# Patient Record
Sex: MALE | Race: WHITE | NOT HISPANIC OR LATINO | Employment: UNEMPLOYED | URBAN - METROPOLITAN AREA
[De-identification: names, ages, dates, MRNs, and addresses within clinical notes are randomized per-mention and may not be internally consistent; named-entity substitution may affect disease eponyms.]

---

## 2017-06-22 ENCOUNTER — OFFICE VISIT (OUTPATIENT)
Dept: INTERNAL MEDICINE | Facility: CLINIC | Age: 27
End: 2017-06-22

## 2017-06-22 VITALS
SYSTOLIC BLOOD PRESSURE: 128 MMHG | OXYGEN SATURATION: 98 % | DIASTOLIC BLOOD PRESSURE: 82 MMHG | WEIGHT: 208 LBS | BODY MASS INDEX: 29.01 KG/M2 | HEART RATE: 83 BPM

## 2017-06-22 DIAGNOSIS — N48.9 PENILE DISORDER: Primary | ICD-10-CM

## 2017-06-22 PROCEDURE — 86696 HERPES SIMPLEX TYPE 2 TEST: CPT | Performed by: PHYSICIAN ASSISTANT

## 2017-06-22 PROCEDURE — 99213 OFFICE O/P EST LOW 20 MIN: CPT | Performed by: PHYSICIAN ASSISTANT

## 2017-06-22 PROCEDURE — 86695 HERPES SIMPLEX TYPE 1 TEST: CPT | Performed by: PHYSICIAN ASSISTANT

## 2017-06-22 NOTE — PROGRESS NOTES
Chief Complaint   Patient presents with   • Test for STD       Subjective   Vinay Cano is a 27 y.o. male.       History of Present Illness     Pt has been told in the past that he had genital herpes and he and girlfriend want to know for sure what his diagnosis is. He has small area of bumps and raised skin on his penis- has been the same for years. Does not have outbreaks or changes. Sometimes has some discomfort with it. No concern for other STDs.        Current Outpatient Prescriptions:   •  lamoTRIgine (LaMICtal) 100 MG tablet, Take 100 mg by mouth Daily., Disp: , Rfl:   •  Lurasidone HCl (LATUDA PO), Take 160 mg by mouth Daily., Disp: , Rfl:   •  propranolol (INDERAL) 40 MG tablet, Take 40 mg by mouth 2 (Two) Times a Day., Disp: , Rfl:   •  sertraline (ZOLOFT) 100 MG tablet, Take 100 mg by mouth Daily., Disp: , Rfl:      PMFSH  The following portions of the patient's history were reviewed and updated as appropriate: allergies, current medications, past family history, past medical history, past social history, past surgical history and problem list.    Review of Systems   Constitutional: Negative for activity change, appetite change, diaphoresis and fatigue.   HENT: Negative.    Respiratory: Negative for chest tightness, shortness of breath and wheezing.    Cardiovascular: Negative for chest pain.   Genitourinary: Positive for genital sores. Negative for discharge, flank pain, frequency, hematuria, penile pain, penile swelling and scrotal swelling.   Neurological: Negative for dizziness, syncope, weakness and light-headedness.       Objective   /82  Pulse 83  Wt 208 lb (94.3 kg)  SpO2 98%  BMI 29.01 kg/m2    Physical Exam   Constitutional: He is oriented to person, place, and time.   HENT:   Head: Normocephalic and atraumatic.   Right Ear: External ear normal.   Left Ear: External ear normal.   Eyes: Conjunctivae are normal.   Neck: Normal range of motion.   Pulmonary/Chest: Effort normal.    Abdominal: Hernia confirmed negative in the right inguinal area and confirmed negative in the left inguinal area.   Genitourinary: Testes normal. Right testis shows no mass, no swelling and no tenderness. Right testis is descended. Left testis shows no mass, no swelling and no tenderness. Left testis is descended. Circumcised. No phimosis, paraphimosis, hypospadias, penile erythema or penile tenderness. No discharge found.   Lymphadenopathy: No inguinal adenopathy noted on the right or left side.   Neurological: He is alert and oriented to person, place, and time.   Skin: Skin is warm and dry.   Psychiatric: He has a normal mood and affect. His behavior is normal. Judgment and thought content normal.         ASSESSMENT/PLAN    Problem List Items Addressed This Visit        Genitourinary    Penile disorder - Primary    Relevant Orders    HSV 1 & 2 - Specific Antibody, IgG    HSV 1 & 2 IgM, Antibodies, Indirect               Return if symptoms worsen or fail to improve.

## 2017-06-26 LAB
HSV1 IGG SER IA-ACNC: 32.7 INDEX (ref 0–0.9)
HSV1 IGM TITR SER IF: NORMAL TITER
HSV2 IGG SER IA-ACNC: <0.91 INDEX (ref 0–0.9)
HSV2 IGM TITR SER IF: NORMAL TITER

## 2019-01-01 ENCOUNTER — OFFICE VISIT (OUTPATIENT)
Dept: INTERNAL MEDICINE | Facility: CLINIC | Age: 29
End: 2019-01-01

## 2019-01-01 VITALS
HEART RATE: 83 BPM | OXYGEN SATURATION: 97 % | SYSTOLIC BLOOD PRESSURE: 110 MMHG | WEIGHT: 223 LBS | HEIGHT: 71 IN | DIASTOLIC BLOOD PRESSURE: 80 MMHG | BODY MASS INDEX: 31.22 KG/M2

## 2019-01-01 DIAGNOSIS — Z72.0 TOBACCO ABUSE: ICD-10-CM

## 2019-01-01 DIAGNOSIS — Z00.00 HEALTH CARE MAINTENANCE: Primary | ICD-10-CM

## 2019-01-01 DIAGNOSIS — Z23 NEED FOR VACCINATION WITH 13-POLYVALENT PNEUMOCOCCAL CONJUGATE VACCINE: ICD-10-CM

## 2019-01-01 LAB
ALBUMIN SERPL-MCNC: 4.8 G/DL (ref 3.5–5.2)
ALBUMIN/GLOB SERPL: 1.8 G/DL
ALP SERPL-CCNC: 63 U/L (ref 39–117)
ALT SERPL W P-5'-P-CCNC: 111 U/L (ref 1–41)
ANION GAP SERPL CALCULATED.3IONS-SCNC: 12.7 MMOL/L (ref 5–15)
AST SERPL-CCNC: 46 U/L (ref 1–40)
BASOPHILS # BLD AUTO: 0.07 10*3/MM3 (ref 0–0.2)
BASOPHILS NFR BLD AUTO: 1.1 % (ref 0–1.5)
BILIRUB SERPL-MCNC: 0.7 MG/DL (ref 0.2–1.2)
BUN BLD-MCNC: 7 MG/DL (ref 6–20)
BUN/CREAT SERPL: 5.3 (ref 7–25)
CALCIUM SPEC-SCNC: 9.7 MG/DL (ref 8.6–10.5)
CHLORIDE SERPL-SCNC: 103 MMOL/L (ref 98–107)
CHOLEST SERPL-MCNC: 167 MG/DL (ref 0–200)
CO2 SERPL-SCNC: 26.3 MMOL/L (ref 22–29)
CREAT BLD-MCNC: 1.33 MG/DL (ref 0.76–1.27)
DEPRECATED RDW RBC AUTO: 42.6 FL (ref 37–54)
EOSINOPHIL # BLD AUTO: 0.09 10*3/MM3 (ref 0–0.4)
EOSINOPHIL NFR BLD AUTO: 1.5 % (ref 0.3–6.2)
ERYTHROCYTE [DISTWIDTH] IN BLOOD BY AUTOMATED COUNT: 13.1 % (ref 12.3–15.4)
GFR SERPL CREATININE-BSD FRML MDRD: 63 ML/MIN/1.73
GLOBULIN UR ELPH-MCNC: 2.7 GM/DL
GLUCOSE BLD-MCNC: 87 MG/DL (ref 65–99)
HCT VFR BLD AUTO: 45.9 % (ref 37.5–51)
HDLC SERPL-MCNC: 33 MG/DL (ref 40–60)
HGB BLD-MCNC: 15.6 G/DL (ref 13–17.7)
IMM GRANULOCYTES # BLD AUTO: 0.03 10*3/MM3 (ref 0–0.05)
IMM GRANULOCYTES NFR BLD AUTO: 0.5 % (ref 0–0.5)
LDLC SERPL CALC-MCNC: 95 MG/DL (ref 0–100)
LDLC/HDLC SERPL: 2.88 {RATIO}
LYMPHOCYTES # BLD AUTO: 2.03 10*3/MM3 (ref 0.7–3.1)
LYMPHOCYTES NFR BLD AUTO: 32.8 % (ref 19.6–45.3)
MCH RBC QN AUTO: 30.8 PG (ref 26.6–33)
MCHC RBC AUTO-ENTMCNC: 34 G/DL (ref 31.5–35.7)
MCV RBC AUTO: 90.5 FL (ref 79–97)
MONOCYTES # BLD AUTO: 0.68 10*3/MM3 (ref 0.1–0.9)
MONOCYTES NFR BLD AUTO: 11 % (ref 5–12)
NEUTROPHILS # BLD AUTO: 3.29 10*3/MM3 (ref 1.7–7)
NEUTROPHILS NFR BLD AUTO: 53.1 % (ref 42.7–76)
NRBC BLD AUTO-RTO: 0 /100 WBC (ref 0–0.2)
PLATELET # BLD AUTO: 291 10*3/MM3 (ref 140–450)
PMV BLD AUTO: 11.1 FL (ref 6–12)
POTASSIUM BLD-SCNC: 4.6 MMOL/L (ref 3.5–5.2)
PROT SERPL-MCNC: 7.5 G/DL (ref 6–8.5)
RBC # BLD AUTO: 5.07 10*6/MM3 (ref 4.14–5.8)
SODIUM BLD-SCNC: 142 MMOL/L (ref 136–145)
TESTOST SERPL-MCNC: 411 NG/DL (ref 249–836)
TRIGL SERPL-MCNC: 194 MG/DL (ref 0–150)
TSH SERPL DL<=0.05 MIU/L-ACNC: 1.72 UIU/ML (ref 0.27–4.2)
VLDLC SERPL-MCNC: 38.8 MG/DL (ref 5–40)
WBC NRBC COR # BLD: 6.19 10*3/MM3 (ref 3.4–10.8)

## 2019-01-01 PROCEDURE — 99395 PREV VISIT EST AGE 18-39: CPT | Performed by: PHYSICIAN ASSISTANT

## 2019-01-01 PROCEDURE — 90670 PCV13 VACCINE IM: CPT | Performed by: PHYSICIAN ASSISTANT

## 2019-01-01 PROCEDURE — 85025 COMPLETE CBC W/AUTO DIFF WBC: CPT | Performed by: PHYSICIAN ASSISTANT

## 2019-01-01 PROCEDURE — 84443 ASSAY THYROID STIM HORMONE: CPT | Performed by: PHYSICIAN ASSISTANT

## 2019-01-01 PROCEDURE — 80061 LIPID PANEL: CPT | Performed by: PHYSICIAN ASSISTANT

## 2019-01-01 PROCEDURE — 90471 IMMUNIZATION ADMIN: CPT | Performed by: PHYSICIAN ASSISTANT

## 2019-01-01 PROCEDURE — 84403 ASSAY OF TOTAL TESTOSTERONE: CPT | Performed by: PHYSICIAN ASSISTANT

## 2019-01-01 PROCEDURE — 80053 COMPREHEN METABOLIC PANEL: CPT | Performed by: PHYSICIAN ASSISTANT

## 2019-01-01 RX ORDER — CARIPRAZINE 1.5 MG/1
CAPSULE, GELATIN COATED ORAL
COMMUNITY
Start: 2019-12-12 | End: 2020-01-01

## 2019-08-14 ENCOUNTER — HOSPITAL ENCOUNTER (EMERGENCY)
Facility: HOSPITAL | Age: 29
Discharge: LEFT BEFORE DISCHARGE | End: 2019-08-14
Attending: EMERGENCY MEDICINE | Admitting: EMERGENCY MEDICINE

## 2019-08-14 ENCOUNTER — APPOINTMENT (OUTPATIENT)
Dept: GENERAL RADIOLOGY | Facility: HOSPITAL | Age: 29
End: 2019-08-14

## 2019-08-14 VITALS
RESPIRATION RATE: 16 BRPM | OXYGEN SATURATION: 98 % | SYSTOLIC BLOOD PRESSURE: 122 MMHG | HEART RATE: 76 BPM | TEMPERATURE: 98 F | HEIGHT: 71 IN | BODY MASS INDEX: 28.7 KG/M2 | DIASTOLIC BLOOD PRESSURE: 68 MMHG | WEIGHT: 205 LBS

## 2019-08-14 DIAGNOSIS — R07.9 ACUTE CHEST PAIN: Primary | ICD-10-CM

## 2019-08-14 DIAGNOSIS — J18.9 PNEUMONIA DUE TO INFECTIOUS ORGANISM, UNSPECIFIED LATERALITY, UNSPECIFIED PART OF LUNG: ICD-10-CM

## 2019-08-14 LAB
ALBUMIN SERPL-MCNC: 4.5 G/DL (ref 3.5–5.2)
ALBUMIN/GLOB SERPL: 1.8 G/DL
ALP SERPL-CCNC: 62 U/L (ref 39–117)
ALT SERPL W P-5'-P-CCNC: 40 U/L (ref 1–41)
ANION GAP SERPL CALCULATED.3IONS-SCNC: 11 MMOL/L (ref 5–15)
AST SERPL-CCNC: 22 U/L (ref 1–40)
BASOPHILS # BLD AUTO: 0.07 10*3/MM3 (ref 0–0.2)
BASOPHILS NFR BLD AUTO: 0.9 % (ref 0–1.5)
BILIRUB SERPL-MCNC: 0.3 MG/DL (ref 0.2–1.2)
BUN BLD-MCNC: 11 MG/DL (ref 6–20)
BUN/CREAT SERPL: 13.9 (ref 7–25)
CALCIUM SPEC-SCNC: 9.3 MG/DL (ref 8.6–10.5)
CHLORIDE SERPL-SCNC: 103 MMOL/L (ref 98–107)
CO2 SERPL-SCNC: 26 MMOL/L (ref 22–29)
CREAT BLD-MCNC: 0.79 MG/DL (ref 0.76–1.27)
D DIMER PPP FEU-MCNC: <0.27 MCGFEU/ML (ref 0–0.56)
DEPRECATED RDW RBC AUTO: 37.9 FL (ref 37–54)
EOSINOPHIL # BLD AUTO: 0.16 10*3/MM3 (ref 0–0.4)
EOSINOPHIL NFR BLD AUTO: 2.1 % (ref 0.3–6.2)
ERYTHROCYTE [DISTWIDTH] IN BLOOD BY AUTOMATED COUNT: 12.4 % (ref 12.3–15.4)
GFR SERPL CREATININE-BSD FRML MDRD: 114 ML/MIN/1.73
GLOBULIN UR ELPH-MCNC: 2.5 GM/DL
GLUCOSE BLD-MCNC: 96 MG/DL (ref 65–99)
HCT VFR BLD AUTO: 45.9 % (ref 37.5–51)
HGB BLD-MCNC: 15.5 G/DL (ref 13–17.7)
HOLD SPECIMEN: NORMAL
HOLD SPECIMEN: NORMAL
IMM GRANULOCYTES # BLD AUTO: 0.03 10*3/MM3 (ref 0–0.05)
IMM GRANULOCYTES NFR BLD AUTO: 0.4 % (ref 0–0.5)
LITHIUM SERPL-SCNC: 0.4 MMOL/L (ref 0.6–1.2)
LYMPHOCYTES # BLD AUTO: 3.13 10*3/MM3 (ref 0.7–3.1)
LYMPHOCYTES NFR BLD AUTO: 41.1 % (ref 19.6–45.3)
MCH RBC QN AUTO: 28.7 PG (ref 26.6–33)
MCHC RBC AUTO-ENTMCNC: 33.8 G/DL (ref 31.5–35.7)
MCV RBC AUTO: 84.8 FL (ref 79–97)
MONOCYTES # BLD AUTO: 0.78 10*3/MM3 (ref 0.1–0.9)
MONOCYTES NFR BLD AUTO: 10.2 % (ref 5–12)
NEUTROPHILS # BLD AUTO: 3.45 10*3/MM3 (ref 1.7–7)
NEUTROPHILS NFR BLD AUTO: 45.3 % (ref 42.7–76)
NRBC BLD AUTO-RTO: 0 /100 WBC (ref 0–0.2)
PLATELET # BLD AUTO: 286 10*3/MM3 (ref 140–450)
PMV BLD AUTO: 11.1 FL (ref 6–12)
POTASSIUM BLD-SCNC: 4.4 MMOL/L (ref 3.5–5.2)
PROT SERPL-MCNC: 7 G/DL (ref 6–8.5)
RBC # BLD AUTO: 5.41 10*6/MM3 (ref 4.14–5.8)
SODIUM BLD-SCNC: 140 MMOL/L (ref 136–145)
TROPONIN T SERPL-MCNC: <0.01 NG/ML (ref 0–0.03)
WBC NRBC COR # BLD: 7.62 10*3/MM3 (ref 3.4–10.8)
WHOLE BLOOD HOLD SPECIMEN: NORMAL
WHOLE BLOOD HOLD SPECIMEN: NORMAL

## 2019-08-14 PROCEDURE — 93005 ELECTROCARDIOGRAM TRACING: CPT | Performed by: EMERGENCY MEDICINE

## 2019-08-14 PROCEDURE — 80178 ASSAY OF LITHIUM: CPT | Performed by: NURSE PRACTITIONER

## 2019-08-14 PROCEDURE — 99283 EMERGENCY DEPT VISIT LOW MDM: CPT

## 2019-08-14 PROCEDURE — 71046 X-RAY EXAM CHEST 2 VIEWS: CPT

## 2019-08-14 PROCEDURE — 80053 COMPREHEN METABOLIC PANEL: CPT | Performed by: EMERGENCY MEDICINE

## 2019-08-14 PROCEDURE — 93005 ELECTROCARDIOGRAM TRACING: CPT

## 2019-08-14 PROCEDURE — 84484 ASSAY OF TROPONIN QUANT: CPT | Performed by: EMERGENCY MEDICINE

## 2019-08-14 PROCEDURE — 85025 COMPLETE CBC W/AUTO DIFF WBC: CPT | Performed by: EMERGENCY MEDICINE

## 2019-08-14 PROCEDURE — 85379 FIBRIN DEGRADATION QUANT: CPT | Performed by: EMERGENCY MEDICINE

## 2019-08-14 RX ORDER — AZITHROMYCIN 250 MG/1
TABLET, FILM COATED ORAL
Qty: 6 TABLET | Refills: 0 | Status: SHIPPED | OUTPATIENT
Start: 2019-08-14 | End: 2019-08-16

## 2019-08-14 RX ORDER — ALBUTEROL SULFATE 90 UG/1
2 AEROSOL, METERED RESPIRATORY (INHALATION) EVERY 4 HOURS PRN
Qty: 1 INHALER | Refills: 0 | Status: SHIPPED | OUTPATIENT
Start: 2019-08-14 | End: 2019-09-20 | Stop reason: ALTCHOICE

## 2019-08-14 NOTE — ED PROVIDER NOTES
Subjective   His anterior chest pain as well as epigastric pain that radiates into his left arm.  Is been off and on for 5 days.  It is not pleuritic.  There is no dizziness.  There is no fever.  He has had a slight cough.  Sent from [facility] for further evaluation.  Patient tells me he has a history of schizophrenia is on multiple psych meds and takes them as prescribed.  He feels much better taking them.  His father is at the bedside.        Chest Pain   Pain location:  Substernal area and epigastric  Pain quality: aching    Pain radiates to:  L shoulder  Pain severity:  Moderate  Onset quality:  Gradual  Duration:  5 days  Timing:  Intermittent  Progression:  Waxing and waning  Chronicity:  New  Context: at rest    Context: not breathing    Relieved by:  Nothing  Worsened by:  Nothing  Ineffective treatments:  None tried  Associated symptoms: anxiety and cough    Associated symptoms: no abdominal pain, no diaphoresis, no dizziness, no fever, no nausea, no shortness of breath and no vomiting        Review of Systems   Constitutional: Negative for chills, diaphoresis and fever.   HENT: Negative for congestion and sore throat.    Respiratory: Positive for cough. Negative for choking, chest tightness, shortness of breath and wheezing.    Cardiovascular: Positive for chest pain. Negative for leg swelling.   Gastrointestinal: Negative for abdominal distention, abdominal pain, anal bleeding, blood in stool, constipation, diarrhea, nausea and vomiting.   Genitourinary: Negative for difficulty urinating, dysuria, flank pain, frequency, hematuria and urgency.   Neurological: Negative for dizziness.   All other systems reviewed and are negative.      Past Medical History:   Diagnosis Date   • Bipolar affective (CMS/HCC)    • Deep groin pain    • Depression    • PTSD (post-traumatic stress disorder)    • Schizo-affective schizophrenia (CMS/HCC)    • Swollen face        No Known Allergies    Past Surgical History:   Procedure  Laterality Date   • HYPOSPADIAS CORRECTION      History of Surgery One Stage Proximal Penile Hypospadias Repair (age 3)   • VARIOCOCELE REPAIR  2005    History of Surgery Spermatic Cord Ligation Of Spermatic Veins For Varicocele       Family History   Problem Relation Age of Onset   • Mitral valve prolapse Mother    • Hypertension Father        Social History     Socioeconomic History   • Marital status: Single     Spouse name: Not on file   • Number of children: Not on file   • Years of education: Not on file   • Highest education level: Not on file   Tobacco Use   • Smoking status: Current Every Day Smoker   • Smokeless tobacco: Former User   Substance and Sexual Activity   • Alcohol use: Yes     Comment: occassional use    • Drug use: No           Objective   Physical Exam   Constitutional: He is oriented to person, place, and time. He appears well-developed and well-nourished.   HENT:   Head: Normocephalic and atraumatic.   Right Ear: External ear normal.   Left Ear: External ear normal.   Nose: Nose normal.   Mouth/Throat: Oropharynx is clear and moist.   Eyes: Conjunctivae and EOM are normal. Pupils are equal, round, and reactive to light.   Neck: Normal range of motion. Neck supple.   Cardiovascular: Normal rate, regular rhythm, normal heart sounds and intact distal pulses.   Pulmonary/Chest: Effort normal and breath sounds normal.   Abdominal: Soft. Bowel sounds are normal.   Musculoskeletal: Normal range of motion.   Neurological: He is alert and oriented to person, place, and time.   Skin: Skin is warm and dry.   Psychiatric: He has a normal mood and affect. His behavior is normal. Judgment normal.       Procedures           ED Course  ED Course as of Aug 14 2022   Wed Aug 14, 2019   1929 So far we have very reassuring work-up with a negative EKG and troponin.  I will add a d-dimer as well as a lithium level and a chest x-ray.  [JM]   2018 Commended a CTA for further evaluation of his lungs given the  results of the chest x-ray.  He refuses.  He will be leaving AGAINST MEDICAL ADVICE.  He is well aware of the signs symptoms worse condition.  He is aware that what I am looking for could affect his life as well as at the disability.  His father is at the bedside.  They are actually walking up and down the [place].  They reported they have been here for hours now ready to leave.  The patient actually looks pretty good no apparent distress.  He does have a history of mental illness.  I will end up treating him for pneumonia and strongly recommend primary care follow-up and he will be discharged AGAINST MEDICAL ADVICE.  [JM]      ED Course User Index  [JM] Jose Ramon Kessler APRN          XR Chest 2 View   Final Result      1. Interval development of thickening of the central peribronchial vascular soft tissues since prior with patchy airspace disease at the left base, probably in the lingula. Please correlate for clinical evidence of asthma or bronchitis with a possible   area of the early pneumonitis or possibly postobstructive atelectasis due to mucous plugging. Follow-up to complete clearing recommended. No acute congestive failure.      Signer Name: Zuleika Lauren MD    Signed: 8/14/2019 7:34 PM    Workstation Name: [email or web address]     [facility]      CT Angiogram Chest With Contrast    (Results Pending)             MDM      Final diagnoses:   Acute chest pain   Pneumonia due to infectious organism, unspecified laterality, unspecified part of lung            Jose Ramon Kessler APRN  08/14/19 2022

## 2019-08-15 NOTE — DISCHARGE INSTRUCTIONS
Follow up with one of the [facility] Primary Care Providers below to setup primary care. If you need assistance coordinating a primary care appointment with a [facility] Primary Care Provider, please contact the Primary Care Coordinators at [phone] for appointment scheduling.    [facility], Primary Care   [address] , [address]   [place], [place] [address] [phone]    [facility] Internal Medicine & Endocrinology  [address], [address]  [place] [address] [phone]    [facility] Family Medicine  [address], [address]   [place] [address] [phone]    [facility] Primary Care  [address], [address]  [place], [place] [address] [phone]    [facility], Primary Care,   [address], [address]   [place], [place] [address] [phone]    [facility]  [address]  [place], [place] [address] [phone]    [facility], Primary Care  [address], [address]  [place], [place] [id number] [phone]    [facility] Internal Medicine & Pediatrics  [address], [address]   [place] [address] [phone]    [facility], Primary Care  [address] [address] [phone]      [facility] Primary [facility]  [address], [address]   [place] [address] [phone]    [facility]  [address] [place] [address] [phone]

## 2019-08-16 ENCOUNTER — OFFICE VISIT (OUTPATIENT)
Dept: INTERNAL MEDICINE | Facility: CLINIC | Age: 29
End: 2019-08-16

## 2019-08-16 VITALS
DIASTOLIC BLOOD PRESSURE: 76 MMHG | BODY MASS INDEX: 29.51 KG/M2 | OXYGEN SATURATION: 98 % | WEIGHT: 211.6 LBS | SYSTOLIC BLOOD PRESSURE: 128 MMHG | HEART RATE: 92 BPM

## 2019-08-16 DIAGNOSIS — J18.9 PNEUMONIA OF LEFT LOWER LOBE DUE TO INFECTIOUS ORGANISM: Primary | ICD-10-CM

## 2019-08-16 PROCEDURE — 96372 THER/PROPH/DIAG INJ SC/IM: CPT | Performed by: PHYSICIAN ASSISTANT

## 2019-08-16 PROCEDURE — 99213 OFFICE O/P EST LOW 20 MIN: CPT | Performed by: PHYSICIAN ASSISTANT

## 2019-08-16 RX ORDER — METHYLPREDNISOLONE ACETATE 40 MG/ML
40 INJECTION, SUSPENSION INTRA-ARTICULAR; INTRALESIONAL; INTRAMUSCULAR; SOFT TISSUE ONCE
Status: COMPLETED | OUTPATIENT
Start: 2019-08-16 | End: 2019-08-16

## 2019-08-16 RX ORDER — DOXYCYCLINE HYCLATE 100 MG/1
100 CAPSULE ORAL 2 TIMES DAILY
COMMUNITY
End: 2019-09-20

## 2019-08-16 RX ADMIN — METHYLPREDNISOLONE ACETATE 40 MG: 40 INJECTION, SUSPENSION INTRA-ARTICULAR; INTRALESIONAL; INTRAMUSCULAR; SOFT TISSUE at 15:30

## 2019-08-16 NOTE — PROGRESS NOTES
Chief Complaint   Patient presents with   • Same Day     ER Follow Up Pneumonia       Subjective   Vinay Cano is a 29 y.o. male.       History of Present Illness     Pt has had pain in his chest and lungs for 4-5 days. Was seen in the ER and diagnosed with pneumonia, started on doxycycline 100 mg BID- has had 3 doses. He has used albuterol inhaler about 5 times since he was in the ER. Feels that his symptoms are improving, he is less short of breath and chest/lung pain has improved.     He does smoke over 1 ppd, wants to quit and has considered switching to vaping to try to quit. Not interested in medication to help him quit.            Current Outpatient Medications:   •  albuterol sulfate  (90 Base) MCG/ACT inhaler, Inhale 2 puffs Every 4 (Four) Hours As Needed for Shortness of Air., Disp: 1 inhaler, Rfl: 0  •  atorvastatin (LIPITOR) 10 MG tablet, Take 10 mg by mouth Daily., Disp: , Rfl:   •  benztropine (COGENTIN) 2 MG tablet, Take 2 mg by mouth 2 (Two) Times a Day., Disp: , Rfl:   •  doxycycline (VIBRAMYCIN) 100 MG capsule, Take 100 mg by mouth 2 (Two) Times a Day., Disp: , Rfl:   •  hydrOXYzine Pamoate (VISTARIL PO), Take  by mouth., Disp: , Rfl:   •  lamoTRIgine (LaMICtal) 100 MG tablet, Take 100 mg by mouth Daily., Disp: , Rfl:   •  LITHIUM PO, Take  by mouth., Disp: , Rfl:   •  Melatonin 10 MG tablet, Take  by mouth., Disp: , Rfl:   •  mupirocin (BACTROBAN) 2 % ointment, Apply  topically to the appropriate area as directed 2 (Two) Times a Day As Needed (nasal ulcer)., Disp: 15 g, Rfl: 1  •  propranolol (INDERAL) 40 MG tablet, Take 40 mg by mouth 2 (Two) Times a Day., Disp: , Rfl:   •  sertraline (ZOLOFT) 100 MG tablet, Take 100 mg by mouth Daily., Disp: , Rfl:      PMFSH  The following portions of the patient's history were reviewed and updated as appropriate: allergies, current medications, past family history, past medical history, past social history, past surgical history and problem  list.    Review of Systems   Constitutional: Negative for activity change, appetite change and fatigue.   HENT: Negative for congestion and rhinorrhea.    Respiratory: Positive for chest tightness and shortness of breath. Negative for cough and wheezing.    Cardiovascular: Positive for chest pain. Negative for palpitations.   Gastrointestinal: Negative for abdominal pain.   Genitourinary: Negative for dysuria.   Musculoskeletal: Negative for arthralgias and myalgias.   Neurological: Negative for dizziness, weakness, light-headedness and headaches.   Psychiatric/Behavioral: Negative for dysphoric mood. The patient is not nervous/anxious.        Objective   /76   Pulse 92   Wt 96 kg (211 lb 9.6 oz)   SpO2 98%   BMI 29.51 kg/m²     Physical Exam   Constitutional: He appears well-developed and well-nourished.   HENT:   Head: Normocephalic.   Right Ear: Hearing, tympanic membrane, external ear and ear canal normal.   Left Ear: Hearing, tympanic membrane, external ear and ear canal normal.   Nose: Nose normal.   Mouth/Throat: Oropharynx is clear and moist.   Eyes: Conjunctivae are normal. Pupils are equal, round, and reactive to light.   Neck: Normal range of motion.   Cardiovascular: Normal rate, regular rhythm and normal heart sounds.   Pulmonary/Chest: Effort normal. He has no decreased breath sounds. He has wheezes in the right lower field. He has rhonchi in the right lower field. He has no rales.   Musculoskeletal: Normal range of motion.   Neurological: He is alert.   Skin: Skin is warm and dry.   Psychiatric: He has a normal mood and affect. His behavior is normal.   Nursing note and vitals reviewed.           ASSESSMENT/PLAN    Problem List Items Addressed This Visit     None      Visit Diagnoses     Pneumonia of left lower lobe due to infectious organism (CMS/[facility])    -  Primary    Improving. Continue doxycycline and albuterol prn. Depomedrol 40 mg IM in office to help with remaining chest tightness. F/u  in 1-2 weeks, sooner if worsening.    Relevant Medications    doxycycline (VIBRAMYCIN) 100 MG capsule    methylPREDNISolone acetate (DEPO-medrol) injection 40 mg (Completed)               Return in about 1 week (around 8/23/2019) for Recheck.

## 2019-08-26 ENCOUNTER — OFFICE VISIT (OUTPATIENT)
Dept: INTERNAL MEDICINE | Facility: CLINIC | Age: 29
End: 2019-08-26

## 2019-08-26 VITALS
OXYGEN SATURATION: 97 % | RESPIRATION RATE: 16 BRPM | DIASTOLIC BLOOD PRESSURE: 78 MMHG | BODY MASS INDEX: 29.51 KG/M2 | SYSTOLIC BLOOD PRESSURE: 124 MMHG | HEIGHT: 71 IN | HEART RATE: 94 BPM

## 2019-08-26 DIAGNOSIS — J18.9 PNEUMONIA OF LEFT LOWER LOBE DUE TO INFECTIOUS ORGANISM: Primary | ICD-10-CM

## 2019-08-26 PROCEDURE — 99212 OFFICE O/P EST SF 10 MIN: CPT | Performed by: PHYSICIAN ASSISTANT

## 2019-08-26 NOTE — PROGRESS NOTES
Chief Complaint   Patient presents with   • Pneumonia     Follow Up       Subjective   Vinay Cano is a 29 y.o. male.       History of Present Illness     Pt has continued to feel better. Minimal chest pain or cough. No shortness of breath or wheezing. Not using his inhaler at all. Completed abx. Has cut back on smoking. Some fatigue, he is taking it easy.      Current Outpatient Medications:   •  albuterol sulfate  (90 Base) MCG/ACT inhaler, Inhale 2 puffs Every 4 (Four) Hours As Needed for Shortness of Air., Disp: 1 inhaler, Rfl: 0  •  atorvastatin (LIPITOR) 10 MG tablet, Take 10 mg by mouth Daily., Disp: , Rfl:   •  benztropine (COGENTIN) 2 MG tablet, Take 2 mg by mouth 2 (Two) Times a Day., Disp: , Rfl:   •  doxycycline (VIBRAMYCIN) 100 MG capsule, Take 100 mg by mouth 2 (Two) Times a Day., Disp: , Rfl:   •  hydrOXYzine Pamoate (VISTARIL PO), Take  by mouth., Disp: , Rfl:   •  lamoTRIgine (LaMICtal) 100 MG tablet, Take 100 mg by mouth Daily., Disp: , Rfl:   •  LITHIUM PO, Take  by mouth., Disp: , Rfl:   •  Melatonin 10 MG tablet, Take  by mouth., Disp: , Rfl:   •  mupirocin (BACTROBAN) 2 % ointment, Apply  topically to the appropriate area as directed 2 (Two) Times a Day As Needed (nasal ulcer)., Disp: 15 g, Rfl: 1  •  propranolol (INDERAL) 40 MG tablet, Take 40 mg by mouth 2 (Two) Times a Day., Disp: , Rfl:   •  sertraline (ZOLOFT) 100 MG tablet, Take 100 mg by mouth Daily., Disp: , Rfl:      PMFSH  The following portions of the patient's history were reviewed and updated as appropriate: allergies, current medications, past family history, past medical history, past social history, past surgical history and problem list.    Review of Systems   Constitutional: Negative for activity change, appetite change and fatigue.   HENT: Negative for congestion and rhinorrhea.    Respiratory: Negative for chest tightness and shortness of breath.    Cardiovascular: Negative for chest pain and palpitations.  "  Gastrointestinal: Negative for abdominal pain.   Genitourinary: Negative for dysuria.   Musculoskeletal: Negative for arthralgias and myalgias.   Neurological: Negative for dizziness, weakness, light-headedness and headaches.   Psychiatric/Behavioral: Negative for dysphoric mood. The patient is not nervous/anxious.        Objective   /78   Pulse 94   Resp 16   Ht 180.3 cm (71\")   SpO2 97%   BMI 29.51 kg/m²     Physical Exam   Constitutional: He appears well-developed and well-nourished.   HENT:   Head: Normocephalic.   Right Ear: Hearing, tympanic membrane, external ear and ear canal normal.   Left Ear: Hearing, tympanic membrane, external ear and ear canal normal.   Nose: Nose normal.   Mouth/Throat: Oropharynx is clear and moist.   Eyes: Conjunctivae are normal. Pupils are equal, round, and reactive to light.   Neck: Normal range of motion.   Cardiovascular: Normal rate, regular rhythm and normal heart sounds.   Pulmonary/Chest: Effort normal and breath sounds normal. He has no decreased breath sounds. He has no wheezes. He has no rhonchi. He has no rales.   Musculoskeletal: Normal range of motion.   Neurological: He is alert.   Skin: Skin is warm and dry.   Psychiatric: He has a normal mood and affect. His behavior is normal.   Nursing note and vitals reviewed.           ASSESSMENT/PLAN    Problem List Items Addressed This Visit     None      Visit Diagnoses     Pneumonia of left lower lobe due to infectious organism (CMS/HCC)    -  Primary    Recheck chest xray later this week. Continue to progress activity as tolerated. Further recs based on test results.    Relevant Orders    XR Chest PA & Lateral               Return in about 3 months (around 11/26/2019) for Annual with fasting labs.  "

## 2019-08-29 ENCOUNTER — HOSPITAL ENCOUNTER (OUTPATIENT)
Dept: GENERAL RADIOLOGY | Facility: HOSPITAL | Age: 29
Discharge: HOME | End: 2019-08-29
Admitting: PHYSICIAN ASSISTANT

## 2019-08-29 DIAGNOSIS — J18.9 PNEUMONIA OF LEFT LOWER LOBE DUE TO INFECTIOUS ORGANISM: ICD-10-CM

## 2019-08-29 PROCEDURE — 71046 X-RAY EXAM CHEST 2 VIEWS: CPT

## 2019-09-05 ENCOUNTER — OFFICE VISIT (OUTPATIENT)
Dept: INTERNAL MEDICINE | Facility: CLINIC | Age: 29
End: 2019-09-05

## 2019-09-05 VITALS
BODY MASS INDEX: 29.51 KG/M2 | TEMPERATURE: 98.6 F | DIASTOLIC BLOOD PRESSURE: 104 MMHG | HEIGHT: 71 IN | OXYGEN SATURATION: 95 % | HEART RATE: 105 BPM | SYSTOLIC BLOOD PRESSURE: 140 MMHG | RESPIRATION RATE: 20 BRPM

## 2019-09-05 DIAGNOSIS — Z72.0 TOBACCO ABUSE: ICD-10-CM

## 2019-09-05 DIAGNOSIS — I10 HYPERTENSION, UNSPECIFIED TYPE: ICD-10-CM

## 2019-09-05 DIAGNOSIS — R06.02 SHORTNESS OF BREATH: Primary | ICD-10-CM

## 2019-09-05 PROCEDURE — 99406 BEHAV CHNG SMOKING 3-10 MIN: CPT | Performed by: PHYSICIAN ASSISTANT

## 2019-09-05 PROCEDURE — 99214 OFFICE O/P EST MOD 30 MIN: CPT | Performed by: PHYSICIAN ASSISTANT

## 2019-09-05 RX ORDER — NICOTINE 21 MG/24HR
1 PATCH, TRANSDERMAL 24 HOURS TRANSDERMAL EVERY 24 HOURS
Qty: 21 EACH | Refills: 0 | Status: SHIPPED | OUTPATIENT
Start: 2019-09-05 | End: 2019-09-20

## 2019-09-05 RX ORDER — NICOTINE 21 MG/24HR
1 PATCH, TRANSDERMAL 24 HOURS TRANSDERMAL EVERY 24 HOURS
Qty: 28 EACH | Refills: 0 | Status: SHIPPED | OUTPATIENT
Start: 2019-09-05 | End: 2019-01-01

## 2019-09-05 NOTE — PROGRESS NOTES
Chief Complaint   Patient presents with   • Shortness of Breath     follow up on chest xray   • Nicotine Dependence       Subjective   Vinay Cano is a 29 y.o. male.       History of Present Illness     Pt wants to discuss chest xray results and discrepancy between 2 interpretations.    He continues to have intermittent shortness of breath. Has morning cough. Continues to smoke about a PPD. Occasionally has sharp chest pain but is related to anxiety. He has used the albuterol inhaler only a couple times in the past few weeks. Has not felt like he needed to use it.      Current Outpatient Medications:   •  atorvastatin (LIPITOR) 10 MG tablet, Take 10 mg by mouth Daily., Disp: , Rfl:   •  benztropine (COGENTIN) 2 MG tablet, Take 2 mg by mouth 2 (Two) Times a Day., Disp: , Rfl:   •  hydrOXYzine Pamoate (VISTARIL PO), Take  by mouth., Disp: , Rfl:   •  lamoTRIgine (LaMICtal) 100 MG tablet, Take 100 mg by mouth Daily., Disp: , Rfl:   •  LITHIUM PO, Take  by mouth., Disp: , Rfl:   •  Melatonin 10 MG tablet, Take  by mouth., Disp: , Rfl:   •  mupirocin (BACTROBAN) 2 % ointment, Apply  topically to the appropriate area as directed 2 (Two) Times a Day As Needed (nasal ulcer)., Disp: 15 g, Rfl: 1  •  propranolol (INDERAL) 40 MG tablet, Take 40 mg by mouth 2 (Two) Times a Day., Disp: , Rfl:   •  sertraline (ZOLOFT) 100 MG tablet, Take 100 mg by mouth Daily., Disp: , Rfl:   •  albuterol sulfate  (90 Base) MCG/ACT inhaler, Inhale 2 puffs Every 4 (Four) Hours As Needed for Shortness of Air., Disp: 1 inhaler, Rfl: 0  •  doxycycline (VIBRAMYCIN) 100 MG capsule, Take 100 mg by mouth 2 (Two) Times a Day., Disp: , Rfl:   •  nicotine (NICODERM CQ) 14 MG/24HR patch, Place 1 patch on the skin as directed by provider Daily., Disp: 21 each, Rfl: 0  •  nicotine (NICODERM CQ) 21 MG/24HR patch, Place 1 patch on the skin as directed by provider Daily., Disp: 28 each, Rfl: 0  •  nicotine (NICODERM CQ) 7 MG/24HR patch, Place 1  "patch on the skin as directed by provider Daily., Disp: 14 each, Rfl: 0     PMFSH  The following portions of the patient's history were reviewed and updated as appropriate: allergies, current medications, past family history, past medical history, past social history, past surgical history and problem list.    Review of Systems   Constitutional: Negative for activity change, appetite change and fatigue.   HENT: Negative for congestion and rhinorrhea.    Respiratory: Positive for shortness of breath. Negative for chest tightness.    Cardiovascular: Negative for chest pain and palpitations.   Gastrointestinal: Negative for abdominal pain.   Genitourinary: Negative for dysuria.   Musculoskeletal: Negative for arthralgias and myalgias.   Neurological: Negative for dizziness, weakness, light-headedness and headaches.   Psychiatric/Behavioral: Negative for dysphoric mood. The patient is not nervous/anxious.        Objective   BP (!) 140/104   Pulse 105   Temp 98.6 °F (37 °C) (Oral)   Resp 20   Ht 180.3 cm (71\")   SpO2 95%   BMI 29.51 kg/m²     Physical Exam   Constitutional: He appears well-developed and well-nourished.   HENT:   Head: Normocephalic.   Right Ear: Hearing, tympanic membrane, external ear and ear canal normal.   Left Ear: Hearing, tympanic membrane, external ear and ear canal normal.   Nose: Nose normal.   Mouth/Throat: Oropharynx is clear and moist.   Eyes: Conjunctivae are normal. Pupils are equal, round, and reactive to light.   Neck: Normal range of motion.   Cardiovascular: Normal rate, regular rhythm and normal heart sounds.   Pulmonary/Chest: Effort normal and breath sounds normal. He has no decreased breath sounds. He has no wheezes. He has no rhonchi. He has no rales.   Musculoskeletal: Normal range of motion.   Neurological: He is alert.   Skin: Skin is warm and dry.   Psychiatric: He has a normal mood and affect. His behavior is normal.   Nursing note and vitals reviewed.       "     ASSESSMENT/PLAN    Problem List Items Addressed This Visit        Cardiovascular and Mediastinum    Hypertension     Blood pressure is worse today, pt is feeling anxious. He will monitor his blood pressure at home and call if remaining elevated.            Respiratory    Shortness of breath - Primary     Discussed chest xray results with radiologist, Dr Dela Cruz. He reviewed both recent chest xrays and felt both were essentially normal without any concern for pneumonia.    Will refer for formal PFT. Further recommendations based on results.         Relevant Orders    Full Pulmonary Function Test With Bronchodilator (Completed)       Other    Tobacco abuse     Smoking Cessation Counseling  DX: tobacco abuse    I advised the patient of the risks of continuing to use tobacco, and I offered smoking cessation materials as well as reviewed strategies and medications that could assist in quitting smoking.     Patient expresses willingness to work on quitting.  Patient remains in precontemplation.    I advised patient to quit, and offered support.  Educational material distributed.  Discussed current use pattern.  Nicotine patches beginning at 21 mg.  I spent approximately 4 minutes counseling the patient.  Barriers: enjoys smoking, helps with stress relief.  Time spent counseling: > 3-10 minutes                 Relevant Medications    nicotine (NICODERM CQ) 21 MG/24HR patch    nicotine (NICODERM CQ) 14 MG/24HR patch    nicotine (NICODERM CQ) 7 MG/24HR patch               Return in about 4 weeks (around 10/3/2019) for Recheck.

## 2019-09-08 NOTE — ASSESSMENT & PLAN NOTE
Discussed chest xray results with radiologist, Dr Dela Cruz. He reviewed both recent chest xrays and felt both were essentially normal without any concern for pneumonia.    Will refer for formal PFT. Further recommendations based on results.

## 2019-09-08 NOTE — ASSESSMENT & PLAN NOTE
Smoking Cessation Counseling  DX: tobacco abuse    I advised the patient of the risks of continuing to use tobacco, and I offered smoking cessation materials as well as reviewed strategies and medications that could assist in quitting smoking.     Patient expresses willingness to work on quitting.  Patient remains in precontemplation.    I advised patient to quit, and offered support.  Educational material distributed.  Discussed current use pattern.  Nicotine patches beginning at 21 mg.  I spent approximately 4 minutes counseling the patient.  Barriers: enjoys smoking, helps with stress relief.  Time spent counseling: > 3-10 minutes

## 2019-09-08 NOTE — ASSESSMENT & PLAN NOTE
Blood pressure is worse today, pt is feeling anxious. He will monitor his blood pressure at home and call if remaining elevated.

## 2019-09-12 ENCOUNTER — HOSPITAL ENCOUNTER (OUTPATIENT)
Dept: PULMONOLOGY | Facility: HOSPITAL | Age: 29
Discharge: HOME | End: 2019-09-12
Admitting: PHYSICIAN ASSISTANT

## 2019-09-12 DIAGNOSIS — R06.02 SHORTNESS OF BREATH: ICD-10-CM

## 2019-09-12 PROCEDURE — 94727 GAS DIL/WSHOT DETER LNG VOL: CPT

## 2019-09-12 PROCEDURE — 94060 EVALUATION OF WHEEZING: CPT

## 2019-09-12 PROCEDURE — 94060 EVALUATION OF WHEEZING: CPT | Performed by: INTERNAL MEDICINE

## 2019-09-12 PROCEDURE — 94727 GAS DIL/WSHOT DETER LNG VOL: CPT | Performed by: INTERNAL MEDICINE

## 2019-09-12 PROCEDURE — 94729 DIFFUSING CAPACITY: CPT

## 2019-09-12 PROCEDURE — 94729 DIFFUSING CAPACITY: CPT | Performed by: INTERNAL MEDICINE

## 2019-09-18 ENCOUNTER — TELEPHONE (OUTPATIENT)
Dept: INTERNAL MEDICINE | Facility: CLINIC | Age: 29
End: 2019-09-18

## 2019-09-18 NOTE — PROGRESS NOTES
Please let him know that his pulmonary function test was normal- it did not show any signs of obstruction.

## 2019-09-20 ENCOUNTER — OFFICE VISIT (OUTPATIENT)
Dept: INTERNAL MEDICINE | Facility: CLINIC | Age: 29
End: 2019-09-20

## 2019-09-20 VITALS
BODY MASS INDEX: 30.13 KG/M2 | OXYGEN SATURATION: 98 % | HEART RATE: 77 BPM | WEIGHT: 216 LBS | SYSTOLIC BLOOD PRESSURE: 120 MMHG | DIASTOLIC BLOOD PRESSURE: 90 MMHG

## 2019-09-20 DIAGNOSIS — Z72.0 TOBACCO ABUSE: ICD-10-CM

## 2019-09-20 DIAGNOSIS — R06.02 SHORTNESS OF BREATH: ICD-10-CM

## 2019-09-20 DIAGNOSIS — Z79.899 LITHIUM USE: Primary | ICD-10-CM

## 2019-09-20 PROCEDURE — 99213 OFFICE O/P EST LOW 20 MIN: CPT | Performed by: PHYSICIAN ASSISTANT

## 2019-09-20 RX ORDER — LEVALBUTEROL TARTRATE 45 UG/1
1-2 AEROSOL, METERED ORAL EVERY 4 HOURS PRN
Qty: 15 G | Refills: 11 | Status: SHIPPED | OUTPATIENT
Start: 2019-09-20

## 2019-09-20 NOTE — PROGRESS NOTES
Chief Complaint   Patient presents with   • Labs Only     Follow Up       Subjective   Vinay Cano is a 29 y.o. male.       History of Present Illness     Pt had the pulmonary function test and it was normal. He has been using the inhaler as needed and does not feel completely like himself. Thinks he does feel shaky and anxious after using the inhaler. Feels a little off with physical and emotional state.    Sees [facility] Hyde for psychiatry care. He has struggle with alcohol addiction in the past, tries to abstain. His mother has requested a lithium level. Pt is also attempting to quit smoking. His last cigarette was yesterday at noon. He wore a nicotine patch yesterday. Today he is using a vape with nicotine and chewing nicotine gum.    Pt has been on propranolol for several years for hypertension. He also takes for hypospadia.       Current Outpatient Medications:   •  atorvastatin (LIPITOR) 10 MG tablet, Take 10 mg by mouth Daily., Disp: , Rfl:   •  benztropine (COGENTIN) 2 MG tablet, Take 2 mg by mouth 2 (Two) Times a Day., Disp: , Rfl:   •  hydrOXYzine Pamoate (VISTARIL PO), Take  by mouth., Disp: , Rfl:   •  lamoTRIgine (LaMICtal) 100 MG tablet, Take 100 mg by mouth Daily., Disp: , Rfl:   •  LITHIUM PO, Take  by mouth., Disp: , Rfl:   •  Melatonin 10 MG tablet, Take  by mouth., Disp: , Rfl:   •  mupirocin (BACTROBAN) 2 % ointment, Apply  topically to the appropriate area as directed 2 (Two) Times a Day As Needed (nasal ulcer)., Disp: 15 g, Rfl: 1  •  nicotine (NICODERM CQ) 21 MG/24HR patch, Place 1 patch on the skin as directed by provider Daily., Disp: 28 each, Rfl: 0  •  propranolol (INDERAL) 40 MG tablet, Take 40 mg by mouth 2 (Two) Times a Day., Disp: , Rfl:   •  sertraline (ZOLOFT) 100 MG tablet, Take 100 mg by mouth Daily., Disp: , Rfl:   •  levalbuterol (XOPENEX HFA) 45 MCG/ACT inhaler, Inhale 1-2 puffs Every 4 (Four) Hours As Needed for Wheezing., Disp: 15 g, Rfl: 11     PMFSH  The following  portions of the patient's history were reviewed and updated as appropriate: allergies, current medications, past family history, past medical history, past social history, past surgical history and problem list.    Review of Systems   Constitutional: Negative for activity change, appetite change and fatigue.   HENT: Negative for congestion and rhinorrhea.    Respiratory: Negative for chest tightness and shortness of breath.    Cardiovascular: Negative for chest pain and palpitations.   Gastrointestinal: Negative for abdominal pain.   Genitourinary: Negative for dysuria.   Musculoskeletal: Negative for arthralgias and myalgias.   Neurological: Negative for dizziness, weakness, light-headedness and headaches.   Psychiatric/Behavioral: Positive for decreased concentration. Negative for dysphoric mood. The patient is nervous/anxious.        Objective   /90   Pulse 77   Wt 98 kg (216 lb)   SpO2 98%   BMI 30.13 kg/m²     Physical Exam   Constitutional: He is oriented to person, place, and time. He appears well-developed and well-nourished. No distress.   HENT:   Head: Normocephalic.   Eyes: Conjunctivae and EOM are normal. Pupils are equal, round, and reactive to light.   Neck: Normal range of motion. Neck supple.   Cardiovascular: Normal rate, regular rhythm and normal heart sounds.   No murmur heard.  Pulmonary/Chest: Effort normal and breath sounds normal.   Musculoskeletal: Normal range of motion.   Neurological: He is alert and oriented to person, place, and time.   Skin: Skin is warm and dry. No rash noted. He is not diaphoretic.   Psychiatric: His behavior is normal. Judgment and thought content normal. His affect is not inappropriate. He is not actively hallucinating. Cognition and memory are normal.   Nursing note and vitals reviewed.           ASSESSMENT/PLAN    Problem List Items Addressed This Visit        Respiratory    Shortness of breath     Change from albuterol to xoponex to continue prn use.             Other    Tobacco abuse     Discussed possibility that nicotine fluctuations are possibly contributing to his physical and emotional feeling changes. He will let his psychiatrist know that he is stopping tobacco use to make sure she is aware. Suggested he wear nicotine patch daily and eliminate vapor- can chew nicotine gum prn.           Other Visit Diagnoses     Lithium use    -  Primary    Check lithium level.    Relevant Orders    Lithium level               Return in about 4 weeks (around 10/18/2019) for Recheck.

## 2019-09-23 NOTE — ASSESSMENT & PLAN NOTE
Discussed possibility that nicotine fluctuations are possibly contributing to his physical and emotional feeling changes. He will let his psychiatrist know that he is stopping tobacco use to make sure she is aware. Suggested he wear nicotine patch daily and eliminate vapor- can chew nicotine gum prn.

## 2019-11-07 ENCOUNTER — PRIOR AUTHORIZATION (OUTPATIENT)
Dept: INTERNAL MEDICINE | Facility: CLINIC | Age: 29
End: 2019-11-07

## 2019-11-11 ENCOUNTER — OFFICE VISIT (OUTPATIENT)
Dept: INTERNAL MEDICINE | Facility: CLINIC | Age: 29
End: 2019-11-11

## 2019-11-11 VITALS
RESPIRATION RATE: 16 BRPM | BODY MASS INDEX: 31.36 KG/M2 | HEART RATE: 94 BPM | OXYGEN SATURATION: 98 % | HEIGHT: 71 IN | SYSTOLIC BLOOD PRESSURE: 122 MMHG | WEIGHT: 224 LBS | DIASTOLIC BLOOD PRESSURE: 80 MMHG

## 2019-11-11 DIAGNOSIS — Z72.0 TOBACCO ABUSE: ICD-10-CM

## 2019-11-11 DIAGNOSIS — R19.7 DIARRHEA, UNSPECIFIED TYPE: ICD-10-CM

## 2019-11-11 DIAGNOSIS — R05.9 COUGH: Primary | ICD-10-CM

## 2019-11-11 DIAGNOSIS — M54.2 NECK PAIN: ICD-10-CM

## 2019-11-11 PROCEDURE — 99214 OFFICE O/P EST MOD 30 MIN: CPT | Performed by: PHYSICIAN ASSISTANT

## 2019-11-11 RX ORDER — METHYLPREDNISOLONE 4 MG/1
TABLET ORAL
Qty: 21 TABLET | Refills: 0 | Status: SHIPPED | OUTPATIENT
Start: 2019-11-11 | End: 2019-01-01

## 2019-11-11 NOTE — PROGRESS NOTES
Chief Complaint   Patient presents with   • Diarrhea     Pt needs labs done   • Cough       Subjective   Vinay Cano is a 29 y.o. male.       History of Present Illness     Pt has not been feeling well. He has had a cough and diarrhea. Having loose stools when he coughs. Cough started 2 weeks ago, only mildly productive. No head congestion, no nasal drainage. No fever. Pt has used his proair inhaler BID without much improvement. Did not get the xoponex inhaler. Cough seems to be worse after drinking coffee or other caffeinated beverages.    Started some immodium and pepto bismol and the diarrhea has improved. Was having intermittent loose stools, sometimes formed. His usual pattern is BM about 3-4 times a day. He does have reflux and heartburn. Worsened with coffee.    He has cut back to 5-10 cigarettes a day. Has switched to chewing tobacco. Also has tried nicotine gum but feels it causes his throat to swell.    Notes some neck pain related to coughing.        Current Outpatient Medications:   •  atorvastatin (LIPITOR) 10 MG tablet, Take 10 mg by mouth Daily., Disp: , Rfl:   •  benztropine (COGENTIN) 2 MG tablet, Take 2 mg by mouth 2 (Two) Times a Day., Disp: , Rfl:   •  hydrOXYzine Pamoate (VISTARIL PO), Take  by mouth., Disp: , Rfl:   •  lamoTRIgine (LaMICtal) 100 MG tablet, Take 100 mg by mouth Daily., Disp: , Rfl:   •  levalbuterol (XOPENEX HFA) 45 MCG/ACT inhaler, Inhale 1-2 puffs Every 4 (Four) Hours As Needed for Wheezing., Disp: 15 g, Rfl: 11  •  LITHIUM PO, Take  by mouth., Disp: , Rfl:   •  Melatonin 10 MG tablet, Take  by mouth., Disp: , Rfl:   •  mupirocin (BACTROBAN) 2 % ointment, Apply  topically to the appropriate area as directed 2 (Two) Times a Day As Needed (nasal ulcer)., Disp: 15 g, Rfl: 1  •  nicotine (NICODERM CQ) 21 MG/24HR patch, Place 1 patch on the skin as directed by provider Daily., Disp: 28 each, Rfl: 0  •  propranolol (INDERAL) 40 MG tablet, Take 80 mg by mouth 2 (Two) Times a  "Day., Disp: , Rfl:   •  sertraline (ZOLOFT) 100 MG tablet, Take 100 mg by mouth Daily., Disp: , Rfl:   •  methylPREDNISolone (MEDROL) 4 MG tablet, follow package directions, Disp: 21 tablet, Rfl: 0     PMFSH  The following portions of the patient's history were reviewed and updated as appropriate: allergies, current medications, past family history, past medical history, past social history, past surgical history and problem list.    Review of Systems   Constitutional: Negative for activity change, appetite change and fatigue.   HENT: Negative for congestion and rhinorrhea.    Respiratory: Positive for cough. Negative for chest tightness and shortness of breath.    Cardiovascular: Negative for chest pain and palpitations.   Gastrointestinal: Positive for diarrhea. Negative for abdominal pain and nausea.   Genitourinary: Negative for dysuria.   Musculoskeletal: Negative for arthralgias and myalgias.   Neurological: Negative for dizziness, weakness, light-headedness and headaches.   Psychiatric/Behavioral: Negative for dysphoric mood. The patient is not nervous/anxious.        Objective   /80   Pulse 94   Resp 16   Ht 180.3 cm (71\")   Wt 102 kg (224 lb)   SpO2 98%   BMI 31.24 kg/m²     Physical Exam   Constitutional: He appears well-developed and well-nourished.   HENT:   Head: Normocephalic.   Right Ear: Hearing, tympanic membrane, external ear and ear canal normal.   Left Ear: Hearing, tympanic membrane, external ear and ear canal normal.   Nose: Nose normal.   Mouth/Throat: Oropharynx is clear and moist.   Eyes: Conjunctivae are normal. Pupils are equal, round, and reactive to light.   Neck: Normal range of motion.   Cardiovascular: Normal rate, regular rhythm and normal heart sounds.   Pulmonary/Chest: Effort normal and breath sounds normal. He has no decreased breath sounds. He has no wheezes. He has no rhonchi. He has no rales.   Abdominal: Bowel sounds are normal. There is no tenderness. "   Musculoskeletal: Normal range of motion.   Neurological: He is alert.   Skin: Skin is warm and dry.   Psychiatric: He has a normal mood and affect. His behavior is normal.   Nursing note and vitals reviewed.      ASSESSMENT/PLAN    Problem List Items Addressed This Visit        Other    Tobacco abuse     Continue to taper down on cigarette use. Discussed not switching to chewing tobacco- not a good alternative.           Other Visit Diagnoses     Cough    -  Primary    Check chest xray and labs as ordered. Start medrol dose pack and mucinex DM and switch to xoponex inhaler. Further recs based on results.    Relevant Medications    methylPREDNISolone (MEDROL) 4 MG tablet    Other Relevant Orders    XR Chest PA & Lateral    CBC & Differential    Comprehensive Metabolic Panel    Diarrhea, unspecified type        Stool pattern is not different from pt's normal, the cough is causing stool accidents. Symptoms have improved with immodium, continue prn.    Neck pain        Start medrol dose pack as directed.               Return in about 4 weeks (around 12/9/2019) for Recheck.

## 2019-11-12 NOTE — ASSESSMENT & PLAN NOTE
Continue to taper down on cigarette use. Discussed not switching to chewing tobacco- not a good alternative.

## 2019-11-13 ENCOUNTER — TRANSCRIBE ORDERS (OUTPATIENT)
Dept: LAB | Facility: HOSPITAL | Age: 29
End: 2019-11-13

## 2019-11-13 ENCOUNTER — LAB (OUTPATIENT)
Dept: LAB | Facility: HOSPITAL | Age: 29
End: 2019-11-13

## 2019-11-13 ENCOUNTER — HOSPITAL ENCOUNTER (OUTPATIENT)
Dept: GENERAL RADIOLOGY | Facility: HOSPITAL | Age: 29
Discharge: HOME | End: 2019-11-13
Admitting: PHYSICIAN ASSISTANT

## 2019-11-13 DIAGNOSIS — F25.0 SCHIZOAFFECTIVE DISORDER, BIPOLAR TYPE (HCC): ICD-10-CM

## 2019-11-13 DIAGNOSIS — F25.0 SCHIZOAFFECTIVE DISORDER, BIPOLAR TYPE (HCC): Primary | ICD-10-CM

## 2019-11-13 DIAGNOSIS — Z79.810 CARE RELATED TO CURRENT TAMOXIFEN USE: ICD-10-CM

## 2019-11-13 DIAGNOSIS — R05.9 COUGH: ICD-10-CM

## 2019-11-13 LAB
ALBUMIN SERPL-MCNC: 4.9 G/DL (ref 3.5–5.2)
ALBUMIN/GLOB SERPL: 1.8 G/DL
ALP SERPL-CCNC: 63 U/L (ref 39–117)
ALT SERPL W P-5'-P-CCNC: 159 U/L (ref 1–41)
ANION GAP SERPL CALCULATED.3IONS-SCNC: 14.5 MMOL/L (ref 5–15)
AST SERPL-CCNC: 52 U/L (ref 1–40)
BASOPHILS # BLD AUTO: 0.07 10*3/MM3 (ref 0–0.2)
BASOPHILS NFR BLD AUTO: 0.6 % (ref 0–1.5)
BILIRUB SERPL-MCNC: 0.4 MG/DL (ref 0.2–1.2)
BUN BLD-MCNC: 11 MG/DL (ref 6–20)
BUN/CREAT SERPL: 12.9 (ref 7–25)
CALCIUM SPEC-SCNC: 9.6 MG/DL (ref 8.6–10.5)
CHLORIDE SERPL-SCNC: 102 MMOL/L (ref 98–107)
CO2 SERPL-SCNC: 26.5 MMOL/L (ref 22–29)
CREAT BLD-MCNC: 0.85 MG/DL (ref 0.76–1.27)
DEPRECATED RDW RBC AUTO: 45.8 FL (ref 37–54)
EOSINOPHIL # BLD AUTO: 0.08 10*3/MM3 (ref 0–0.4)
EOSINOPHIL NFR BLD AUTO: 0.7 % (ref 0.3–6.2)
ERYTHROCYTE [DISTWIDTH] IN BLOOD BY AUTOMATED COUNT: 14.4 % (ref 12.3–15.4)
GFR SERPL CREATININE-BSD FRML MDRD: 105 ML/MIN/1.73
GLOBULIN UR ELPH-MCNC: 2.7 GM/DL
GLUCOSE BLD-MCNC: 76 MG/DL (ref 65–99)
HCT VFR BLD AUTO: 48.3 % (ref 37.5–51)
HGB BLD-MCNC: 16 G/DL (ref 13–17.7)
IMM GRANULOCYTES # BLD AUTO: 0.08 10*3/MM3 (ref 0–0.05)
IMM GRANULOCYTES NFR BLD AUTO: 0.7 % (ref 0–0.5)
LITHIUM SERPL-SCNC: 0.2 MMOL/L (ref 0.6–1.2)
LYMPHOCYTES # BLD AUTO: 2.74 10*3/MM3 (ref 0.7–3.1)
LYMPHOCYTES NFR BLD AUTO: 24.5 % (ref 19.6–45.3)
MCH RBC QN AUTO: 29.4 PG (ref 26.6–33)
MCHC RBC AUTO-ENTMCNC: 33.1 G/DL (ref 31.5–35.7)
MCV RBC AUTO: 88.8 FL (ref 79–97)
MONOCYTES # BLD AUTO: 1.1 10*3/MM3 (ref 0.1–0.9)
MONOCYTES NFR BLD AUTO: 9.8 % (ref 5–12)
NEUTROPHILS # BLD AUTO: 7.11 10*3/MM3 (ref 1.7–7)
NEUTROPHILS NFR BLD AUTO: 63.7 % (ref 42.7–76)
NRBC BLD AUTO-RTO: 0 /100 WBC (ref 0–0.2)
PLATELET # BLD AUTO: 306 10*3/MM3 (ref 140–450)
PMV BLD AUTO: 11.3 FL (ref 6–12)
POTASSIUM BLD-SCNC: 3.8 MMOL/L (ref 3.5–5.2)
PROT SERPL-MCNC: 7.6 G/DL (ref 6–8.5)
RBC # BLD AUTO: 5.44 10*6/MM3 (ref 4.14–5.8)
SODIUM BLD-SCNC: 143 MMOL/L (ref 136–145)
T3FREE SERPL-MCNC: 3.15 PG/ML (ref 2–4.4)
T4 FREE SERPL-MCNC: 1.77 NG/DL (ref 0.93–1.7)
T4 SERPL-MCNC: 6.94 MCG/DL (ref 4.5–11.7)
TSH SERPL DL<=0.05 MIU/L-ACNC: 1.64 UIU/ML (ref 0.27–4.2)
WBC NRBC COR # BLD: 11.18 10*3/MM3 (ref 3.4–10.8)

## 2019-11-13 PROCEDURE — 84443 ASSAY THYROID STIM HORMONE: CPT

## 2019-11-13 PROCEDURE — 80178 ASSAY OF LITHIUM: CPT

## 2019-11-13 PROCEDURE — 36415 COLL VENOUS BLD VENIPUNCTURE: CPT | Performed by: NURSE PRACTITIONER

## 2019-11-13 PROCEDURE — 84439 ASSAY OF FREE THYROXINE: CPT

## 2019-11-13 PROCEDURE — 71046 X-RAY EXAM CHEST 2 VIEWS: CPT

## 2019-11-13 PROCEDURE — 80053 COMPREHEN METABOLIC PANEL: CPT | Performed by: NURSE PRACTITIONER

## 2019-11-13 PROCEDURE — 85025 COMPLETE CBC W/AUTO DIFF WBC: CPT

## 2019-11-13 PROCEDURE — 84481 FREE ASSAY (FT-3): CPT

## 2019-12-24 PROBLEM — Z00.00 HEALTH CARE MAINTENANCE: Status: ACTIVE | Noted: 2019-01-01

## 2019-12-24 NOTE — PROGRESS NOTES
"Chief Complaint   Patient presents with   • Annual Exam       Subjective   Vinay Cano is a 29 y.o. male.       History of Present Illness     The patient is being seen for a health maintenance evaluation.  The last health maintenance was unknown year(s) ago.    Social History  Vinay  does smoke cigarettes. Pt is smoking about 10 cigarettes a day.   He drinks no alcohol.  He does not use illicit drugs.    General History  Vinay  does have regular dental visits.  He does complain of vision problems. Wears reading glasses. Last eye exam was 2017  Immunizations are not up to date. The patient needs the following immunizations: pneumovax 23 recommended;     Lifestyle  Vinay  consumes a in general, an \"unhealthy\" diet.  He exercises three times a week.    Reproductive Health  Vinay  is sexually active. His contraceptive plan is condoms, oral contraceptives (estrogen/progesterone).   He does have erectile dysfunction.     Screening  Last PSA was never.  Last prostate exam was never. Family history of prostate cancer: none  Last testicular exam was unknown.   Last colonoscopy was never. Family history of colon cancer: maternal uncle with colon cancer in his 60s; father with polyps  Other pertinent family history and/or screenings: mother with lung cancer recently in her 60s                        Current Outpatient Medications:   •  atorvastatin (LIPITOR) 10 MG tablet, Take 10 mg by mouth Daily., Disp: , Rfl:   •  benztropine (COGENTIN) 2 MG tablet, Take 2 mg by mouth 2 (Two) Times a Day., Disp: , Rfl:   •  lamoTRIgine (LaMICtal) 100 MG tablet, Take 100 mg by mouth Daily., Disp: , Rfl:   •  levalbuterol (XOPENEX HFA) 45 MCG/ACT inhaler, Inhale 1-2 puffs Every 4 (Four) Hours As Needed for Wheezing., Disp: 15 g, Rfl: 11  •  LITHIUM PO, Take  by mouth., Disp: , Rfl:   •  Melatonin 10 MG tablet, Take  by mouth., Disp: , Rfl:   •  mupirocin (BACTROBAN) 2 % ointment, Apply  topically to the " "appropriate area as directed 2 (Two) Times a Day As Needed (nasal ulcer)., Disp: 15 g, Rfl: 1  •  propranolol (INDERAL) 40 MG tablet, Take 80 mg by mouth 2 (Two) Times a Day., Disp: , Rfl:   •  sertraline (ZOLOFT) 100 MG tablet, Take 100 mg by mouth Daily., Disp: , Rfl:   •  VRAYLAR 1.5 MG capsule capsule, Nightly, Disp: , Rfl:      PMFSH  The following portions of the patient's history were reviewed and updated as appropriate: allergies, current medications, past family history, past medical history, past social history, past surgical history and problem list.    Review of Systems   Constitutional: Negative for appetite change, fever and unexpected weight change.   HENT: Negative for ear pain, facial swelling and sore throat.    Eyes: Negative for pain and visual disturbance.   Respiratory: Negative for chest tightness, shortness of breath and wheezing.    Cardiovascular: Negative for chest pain and palpitations.   Gastrointestinal: Negative for abdominal pain and blood in stool.   Endocrine: Negative.    Genitourinary: Negative for difficulty urinating and hematuria.   Musculoskeletal: Negative for joint swelling.   Neurological: Negative for dizziness, tremors, seizures, syncope and headaches.   Hematological: Negative for adenopathy.   Psychiatric/Behavioral: Negative.        Objective   /80   Pulse 83   Ht 180.3 cm (71\")   Wt 101 kg (223 lb)   SpO2 97%   BMI 31.10 kg/m²     Physical Exam   Constitutional: He is oriented to person, place, and time. He appears well-developed and well-nourished. No distress.   HENT:   Head: Normocephalic and atraumatic. Hair is normal.   Right Ear: Hearing, tympanic membrane, external ear and ear canal normal.   Left Ear: Hearing, tympanic membrane, external ear and ear canal normal.   Nose: No sinus tenderness or nasal deformity.   Mouth/Throat: Uvula is midline, oropharynx is clear and moist and mucous membranes are normal. No oral lesions. No uvula swelling.   Eyes: " Pupils are equal, round, and reactive to light. Conjunctivae, EOM and lids are normal. Right eye exhibits no discharge. Left eye exhibits no discharge. No scleral icterus. Right eye exhibits normal extraocular motion and no nystagmus. Left eye exhibits normal extraocular motion and no nystagmus.   Fundoscopic exam:       The right eye shows red reflex.        The left eye shows red reflex.   Neck: Normal range of motion. Neck supple. No JVD present. No tracheal deviation present. No thyromegaly present.   Cardiovascular: Normal rate, regular rhythm, normal heart sounds, intact distal pulses and normal pulses. Exam reveals no gallop.   No murmur heard.  Pulmonary/Chest: Effort normal and breath sounds normal. No respiratory distress. He has no wheezes. He has no rales. He exhibits no tenderness.   Abdominal: Soft. Bowel sounds are normal. He exhibits no distension and no mass. There is no tenderness. There is no guarding. No hernia. Hernia confirmed negative in the right inguinal area and confirmed negative in the left inguinal area.   Genitourinary: Testes normal and penis normal.   Genitourinary Comments: Declined chaperone for  exam   Musculoskeletal: Normal range of motion. He exhibits no edema, tenderness or deformity.   Lymphadenopathy:     He has no cervical adenopathy. No inguinal adenopathy noted on the right or left side.   Neurological: He is alert and oriented to person, place, and time. He has normal reflexes. He displays normal reflexes. No cranial nerve deficit. He exhibits normal muscle tone. Coordination normal.   Skin: Skin is warm and dry. No rash noted. He is not diaphoretic.   Psychiatric: He has a normal mood and affect. His behavior is normal. Judgment and thought content normal.   Nursing note and vitals reviewed.           ASSESSMENT/PLAN    Problem List Items Addressed This Visit        Other    Tobacco abuse     Improving, reduced tobacco use to 10 cigarettes daily. Pt will continue to  work on reducing number of cigarettes daily.         Health care maintenance - Primary     Immunizations: pneumovax 23 done today; declined influenza  Eye exam: done 2017  Prostate exam: due age 48  PSA: due age 38  Colonoscopy: due age 43  Labs: fasting labs ordered    Counseled patient regarding multimodal approach with healthy nutrition, healthy sleep, regular physical activity, social activities, counseling, safety measures and medications.            Relevant Orders    CBC & Differential (Completed)    Comprehensive Metabolic Panel (Completed)    Lipid Panel (Completed)    TSH (Completed)    Testosterone (Completed)    CBC Auto Differential (Completed)      Other Visit Diagnoses     Need for vaccination with 13-polyvalent pneumococcal conjugate vaccine        Relevant Orders    Pneumococcal Conjugate Vaccine 13-Valent All (Completed)               Return in about 3 months (around 3/24/2020) for Recheck, Annual in 1 year.

## 2019-12-27 NOTE — ASSESSMENT & PLAN NOTE
Immunizations: pneumovax 23 done today; declined influenza  Eye exam: done 2017  Prostate exam: due age 48  PSA: due age 38  Colonoscopy: due age 43  Labs: fasting labs ordered    Counseled patient regarding multimodal approach with healthy nutrition, healthy sleep, regular physical activity, social activities, counseling, safety measures and medications.

## 2019-12-27 NOTE — ASSESSMENT & PLAN NOTE
Improving, reduced tobacco use to 10 cigarettes daily. Pt will continue to work on reducing number of cigarettes daily.

## 2020-01-01 ENCOUNTER — OFFICE VISIT (OUTPATIENT)
Dept: INTERNAL MEDICINE | Facility: CLINIC | Age: 30
End: 2020-01-01

## 2020-01-01 ENCOUNTER — LAB (OUTPATIENT)
Dept: LAB | Facility: HOSPITAL | Age: 30
End: 2020-01-01

## 2020-01-01 ENCOUNTER — TELEPHONE (OUTPATIENT)
Dept: INTERNAL MEDICINE | Facility: CLINIC | Age: 30
End: 2020-01-01

## 2020-01-01 ENCOUNTER — PATIENT MESSAGE (OUTPATIENT)
Dept: INTERNAL MEDICINE | Facility: CLINIC | Age: 30
End: 2020-01-01

## 2020-01-01 VITALS
HEIGHT: 71 IN | WEIGHT: 190 LBS | HEART RATE: 95 BPM | OXYGEN SATURATION: 98 % | SYSTOLIC BLOOD PRESSURE: 128 MMHG | BODY MASS INDEX: 26.6 KG/M2 | DIASTOLIC BLOOD PRESSURE: 78 MMHG | TEMPERATURE: 99 F

## 2020-01-01 VITALS
OXYGEN SATURATION: 97 % | SYSTOLIC BLOOD PRESSURE: 118 MMHG | BODY MASS INDEX: 25.72 KG/M2 | HEART RATE: 98 BPM | DIASTOLIC BLOOD PRESSURE: 90 MMHG | WEIGHT: 184.4 LBS

## 2020-01-01 VITALS
HEART RATE: 95 BPM | TEMPERATURE: 98.2 F | BODY MASS INDEX: 26.11 KG/M2 | WEIGHT: 187.2 LBS | SYSTOLIC BLOOD PRESSURE: 128 MMHG | OXYGEN SATURATION: 95 % | DIASTOLIC BLOOD PRESSURE: 80 MMHG

## 2020-01-01 VITALS
WEIGHT: 189.8 LBS | OXYGEN SATURATION: 95 % | HEART RATE: 86 BPM | DIASTOLIC BLOOD PRESSURE: 100 MMHG | BODY MASS INDEX: 26.47 KG/M2 | SYSTOLIC BLOOD PRESSURE: 132 MMHG

## 2020-01-01 VITALS
SYSTOLIC BLOOD PRESSURE: 122 MMHG | BODY MASS INDEX: 29.07 KG/M2 | HEART RATE: 72 BPM | DIASTOLIC BLOOD PRESSURE: 98 MMHG | OXYGEN SATURATION: 98 % | WEIGHT: 208.4 LBS

## 2020-01-01 VITALS
WEIGHT: 193 LBS | DIASTOLIC BLOOD PRESSURE: 78 MMHG | SYSTOLIC BLOOD PRESSURE: 118 MMHG | HEART RATE: 84 BPM | BODY MASS INDEX: 26.92 KG/M2 | OXYGEN SATURATION: 98 %

## 2020-01-01 DIAGNOSIS — N52.9 ERECTILE DYSFUNCTION, UNSPECIFIED ERECTILE DYSFUNCTION TYPE: ICD-10-CM

## 2020-01-01 DIAGNOSIS — R06.02 SHORTNESS OF BREATH: ICD-10-CM

## 2020-01-01 DIAGNOSIS — Z11.3 SCREENING EXAMINATION FOR STD (SEXUALLY TRANSMITTED DISEASE): ICD-10-CM

## 2020-01-01 DIAGNOSIS — F20.0 PARANOID SCHIZOPHRENIA (HCC): Primary | ICD-10-CM

## 2020-01-01 DIAGNOSIS — R11.2 NAUSEA AND VOMITING, INTRACTABILITY OF VOMITING NOT SPECIFIED, UNSPECIFIED VOMITING TYPE: ICD-10-CM

## 2020-01-01 DIAGNOSIS — R74.8 ELEVATED LIVER ENZYMES: ICD-10-CM

## 2020-01-01 DIAGNOSIS — I10 HYPERTENSION, UNSPECIFIED TYPE: ICD-10-CM

## 2020-01-01 DIAGNOSIS — Z86.018 HISTORY OF ATYPICAL SKIN MOLE: ICD-10-CM

## 2020-01-01 DIAGNOSIS — F20.0 PARANOID SCHIZOPHRENIA (HCC): ICD-10-CM

## 2020-01-01 DIAGNOSIS — Z72.0 TOBACCO ABUSE: Primary | ICD-10-CM

## 2020-01-01 DIAGNOSIS — Z11.3 SCREENING EXAMINATION FOR STD (SEXUALLY TRANSMITTED DISEASE): Primary | ICD-10-CM

## 2020-01-01 DIAGNOSIS — Z20.822 EXPOSURE TO COVID-19 VIRUS: ICD-10-CM

## 2020-01-01 DIAGNOSIS — N48.9 PENILE DISORDER: ICD-10-CM

## 2020-01-01 DIAGNOSIS — Z59.89 LIVING ACCOMMODATION ISSUES: ICD-10-CM

## 2020-01-01 DIAGNOSIS — I10 HYPERTENSION, UNSPECIFIED TYPE: Primary | ICD-10-CM

## 2020-01-01 DIAGNOSIS — R74.8 ELEVATED LIVER ENZYMES: Primary | ICD-10-CM

## 2020-01-01 DIAGNOSIS — N52.9 ERECTILE DYSFUNCTION, UNSPECIFIED ERECTILE DYSFUNCTION TYPE: Primary | ICD-10-CM

## 2020-01-01 LAB
ALBUMIN SERPL-MCNC: 4.5 G/DL (ref 3.5–5.2)
ALBUMIN/GLOB SERPL: 2 G/DL
ALP SERPL-CCNC: 59 U/L (ref 39–117)
ALT SERPL W P-5'-P-CCNC: 59 U/L (ref 1–41)
ANION GAP SERPL CALCULATED.3IONS-SCNC: 10.1 MMOL/L (ref 5–15)
AST SERPL-CCNC: 32 U/L (ref 1–40)
BILIRUB SERPL-MCNC: 0.6 MG/DL (ref 0.2–1.2)
BUN BLD-MCNC: 11 MG/DL (ref 6–20)
BUN/CREAT SERPL: 12.2 (ref 7–25)
C TRACH RRNA SPEC DONR QL NAA+PROBE: NEGATIVE
CALCIUM SPEC-SCNC: 9.6 MG/DL (ref 8.6–10.5)
CHLORIDE SERPL-SCNC: 103 MMOL/L (ref 98–107)
CO2 SERPL-SCNC: 24.9 MMOL/L (ref 22–29)
CREAT BLD-MCNC: 0.9 MG/DL (ref 0.76–1.27)
GFR SERPL CREATININE-BSD FRML MDRD: 98 ML/MIN/1.73
GLOBULIN UR ELPH-MCNC: 2.2 GM/DL
GLUCOSE BLD-MCNC: 85 MG/DL (ref 65–99)
HAV IGM SERPL QL IA: NORMAL
HBV CORE IGM SERPL QL IA: NORMAL
HBV SURFACE AG SERPL QL IA: NORMAL
HCV AB SER DONR QL: NORMAL
HIV1+2 AB SER QL: NORMAL
HOLD SPECIMEN: NORMAL
N GONORRHOEA DNA SPEC QL NAA+PROBE: NEGATIVE
POTASSIUM BLD-SCNC: 4.6 MMOL/L (ref 3.5–5.2)
PROT SERPL-MCNC: 6.7 G/DL (ref 6–8.5)
SODIUM BLD-SCNC: 138 MMOL/L (ref 136–145)

## 2020-01-01 PROCEDURE — G0432 EIA HIV-1/HIV-2 SCREEN: HCPCS | Performed by: PHYSICIAN ASSISTANT

## 2020-01-01 PROCEDURE — 87491 CHLMYD TRACH DNA AMP PROBE: CPT | Performed by: PHYSICIAN ASSISTANT

## 2020-01-01 PROCEDURE — 80074 ACUTE HEPATITIS PANEL: CPT | Performed by: PHYSICIAN ASSISTANT

## 2020-01-01 PROCEDURE — 99213 OFFICE O/P EST LOW 20 MIN: CPT | Performed by: PHYSICIAN ASSISTANT

## 2020-01-01 PROCEDURE — 87591 N.GONORRHOEAE DNA AMP PROB: CPT | Performed by: PHYSICIAN ASSISTANT

## 2020-01-01 PROCEDURE — 80053 COMPREHEN METABOLIC PANEL: CPT | Performed by: PHYSICIAN ASSISTANT

## 2020-01-01 PROCEDURE — 36415 COLL VENOUS BLD VENIPUNCTURE: CPT

## 2020-01-01 PROCEDURE — 99214 OFFICE O/P EST MOD 30 MIN: CPT | Performed by: PHYSICIAN ASSISTANT

## 2020-01-01 PROCEDURE — 99406 BEHAV CHNG SMOKING 3-10 MIN: CPT | Performed by: PHYSICIAN ASSISTANT

## 2020-01-01 RX ORDER — TADALAFIL 20 MG/1
20 TABLET ORAL AS NEEDED
Qty: 20 TABLET | Refills: 3 | Status: SHIPPED | OUTPATIENT
Start: 2020-01-01 | End: 2020-01-01 | Stop reason: SDUPTHER

## 2020-01-01 RX ORDER — ONDANSETRON 4 MG/1
4 TABLET, ORALLY DISINTEGRATING ORAL DAILY
Qty: 30 TABLET | Refills: 2 | OUTPATIENT
Start: 2020-01-01

## 2020-01-01 RX ORDER — TADALAFIL 20 MG/1
20 TABLET ORAL AS NEEDED
Qty: 20 TABLET | Refills: 3 | OUTPATIENT
Start: 2020-01-01

## 2020-01-01 RX ORDER — PROPRANOLOL HCL 60 MG
60 CAPSULE, EXTENDED RELEASE 24HR ORAL DAILY
Qty: 30 CAPSULE | Refills: 3 | Status: SHIPPED | OUTPATIENT
Start: 2020-01-01

## 2020-01-01 RX ORDER — PROPRANOLOL HYDROCHLORIDE 80 MG/1
80 TABLET ORAL 2 TIMES DAILY
Qty: 60 TABLET | Refills: 5 | Status: SHIPPED | OUTPATIENT
Start: 2020-01-01 | End: 2020-01-01

## 2020-01-01 RX ORDER — ONDANSETRON 4 MG/1
4 TABLET, ORALLY DISINTEGRATING ORAL EVERY 12 HOURS PRN
Qty: 60 TABLET | Refills: 2 | Status: SHIPPED | OUTPATIENT
Start: 2020-01-01

## 2020-01-01 RX ORDER — ATORVASTATIN CALCIUM 10 MG/1
10 TABLET, FILM COATED ORAL DAILY
Qty: 30 TABLET | Refills: 5 | Status: SHIPPED | OUTPATIENT
Start: 2020-01-01

## 2020-01-01 RX ORDER — ONDANSETRON 4 MG/1
4 TABLET, ORALLY DISINTEGRATING ORAL DAILY
Qty: 30 TABLET | Refills: 2 | Status: SHIPPED | OUTPATIENT
Start: 2020-01-01 | End: 2020-01-01 | Stop reason: SDUPTHER

## 2020-01-01 RX ORDER — VARENICLINE TARTRATE 1 MG/1
1 TABLET, FILM COATED ORAL 2 TIMES DAILY
Qty: 60 TABLET | Refills: 2 | Status: SHIPPED | OUTPATIENT
Start: 2020-01-01

## 2020-01-01 RX ORDER — PROPRANOLOL HYDROCHLORIDE 80 MG/1
80 TABLET ORAL 2 TIMES DAILY
Qty: 60 TABLET | Refills: 5 | Status: SHIPPED | OUTPATIENT
Start: 2020-01-01 | End: 2020-01-01 | Stop reason: SDUPTHER

## 2020-01-01 RX ORDER — TADALAFIL 10 MG/1
20 TABLET ORAL AS NEEDED
Qty: 10 TABLET | Refills: 1 | Status: SHIPPED | OUTPATIENT
Start: 2020-01-01 | End: 2020-01-01 | Stop reason: SDUPTHER

## 2020-01-01 RX ORDER — TADALAFIL 20 MG/1
20 TABLET ORAL AS NEEDED
Qty: 20 TABLET | Refills: 3 | Status: SHIPPED | OUTPATIENT
Start: 2020-01-01

## 2020-01-01 RX ORDER — ONDANSETRON 4 MG/1
4 TABLET, ORALLY DISINTEGRATING ORAL EVERY 8 HOURS PRN
Qty: 20 TABLET | Refills: 0 | Status: SHIPPED | OUTPATIENT
Start: 2020-01-01 | End: 2020-01-01 | Stop reason: SDUPTHER

## 2020-01-01 RX ORDER — CARIPRAZINE 1.5 MG/1
1.5 CAPSULE, GELATIN COATED ORAL DAILY
Qty: 30 CAPSULE | Refills: 3 | Status: SHIPPED | OUTPATIENT
Start: 2020-01-01 | End: 2020-01-01

## 2020-01-01 RX ORDER — TADALAFIL 10 MG/1
10 TABLET ORAL AS NEEDED
Qty: 10 TABLET | Refills: 1 | Status: SHIPPED | OUTPATIENT
Start: 2020-01-01 | End: 2020-01-01 | Stop reason: SDUPTHER

## 2020-01-01 RX ORDER — PHENOL 1.4 %
10 AEROSOL, SPRAY (ML) MUCOUS MEMBRANE NIGHTLY
Qty: 30 TABLET | Refills: 0 | Status: SHIPPED | OUTPATIENT
Start: 2020-01-01 | End: 2020-01-01

## 2020-01-01 RX ORDER — ONDANSETRON 4 MG/1
TABLET, ORALLY DISINTEGRATING ORAL
Qty: 20 TABLET | Refills: 0 | OUTPATIENT
Start: 2020-01-01

## 2020-01-01 RX ORDER — ATORVASTATIN CALCIUM 10 MG/1
10 TABLET, FILM COATED ORAL DAILY
Qty: 30 TABLET | Refills: 5 | Status: SHIPPED | OUTPATIENT
Start: 2020-01-01 | End: 2020-01-01 | Stop reason: SDUPTHER

## 2020-01-01 SDOH — ECONOMIC STABILITY - INCOME SECURITY: OTHER PROBLEMS RELATED TO HOUSING AND ECONOMIC CIRCUMSTANCES: Z59.89

## 2020-03-09 NOTE — TELEPHONE ENCOUNTER
Pt's mother called with concerns of pt's blood pressure and cholesterol medications      propranolol (INDERAL) 40 MG tablet    And    atorvastatin (LIPITOR) 10 MG tablet      Please callback   Kimberly    [phone]

## 2020-03-09 NOTE — PROGRESS NOTES
Chief Complaint   Patient presents with   • Hypertension     Follow Up   • Depression with anxiety       Subjective   Vinay Cano is a 30 y.o. male.       History of Present Illness     Pt has not quit smoking, switched to ultra-lite and has been feeling better. Using his inhaler prn and not on a daily basis.    He stopped drinking alcohol about a week ago. His drinking had gotten out of control and now he is back living with his parents. They help him with accountability with medications. Feels like he is in a relatively good place with his mental health.    He is taking propranolol for his blood pressure and it is working. Helps with his hypospadia and anxiety.         Current Outpatient Medications:   •  atorvastatin (LIPITOR) 10 MG tablet, Take 10 mg by mouth Daily., Disp: , Rfl:   •  benztropine (COGENTIN) 2 MG tablet, Take 2 mg by mouth 2 (Two) Times a Day., Disp: , Rfl:   •  lamoTRIgine (LaMICtal) 100 MG tablet, Take 100 mg by mouth Daily., Disp: , Rfl:   •  levalbuterol (XOPENEX HFA) 45 MCG/ACT inhaler, Inhale 1-2 puffs Every 4 (Four) Hours As Needed for Wheezing., Disp: 15 g, Rfl: 11  •  LITHIUM PO, Take  by mouth., Disp: , Rfl:   •  Melatonin 10 MG tablet, Take  by mouth., Disp: , Rfl:   •  mupirocin (BACTROBAN) 2 % ointment, Apply  topically to the appropriate area as directed 2 (Two) Times a Day As Needed (nasal ulcer)., Disp: 15 g, Rfl: 1  •  sertraline (ZOLOFT) 100 MG tablet, Take 100 mg by mouth Daily., Disp: , Rfl:   •  VRAYLAR 1.5 MG capsule capsule, Nightly, Disp: , Rfl:   •  propranolol (INDERAL) 80 MG tablet, Take 1 tablet by mouth 2 (Two) Times a Day., Disp: 60 tablet, Rfl: 5     PMFSH  The following portions of the patient's history were reviewed and updated as appropriate: allergies, current medications, past family history, past medical history, past social history, past surgical history and problem list.    Review of Systems   Constitutional: Negative for activity change, appetite  change and fatigue.   HENT: Negative for congestion and rhinorrhea.    Respiratory: Negative for chest tightness and shortness of breath.    Cardiovascular: Negative for chest pain and palpitations.   Gastrointestinal: Negative for abdominal pain.   Genitourinary: Negative for dysuria.   Musculoskeletal: Negative for arthralgias and myalgias.   Neurological: Negative for dizziness, weakness, light-headedness and headaches.   Psychiatric/Behavioral: Negative for dysphoric mood. The patient is not nervous/anxious.        Objective   /98   Pulse 72   Wt 94.5 kg (208 lb 6.4 oz)   SpO2 98%   BMI 29.07 kg/m²     Physical Exam   Constitutional: He appears well-developed and well-nourished.   HENT:   Head: Normocephalic and atraumatic.   Right Ear: External ear normal.   Left Ear: External ear normal.   Eyes: Conjunctivae are normal.   Neck: Normal range of motion.   Cardiovascular: Normal rate and regular rhythm.   Pulmonary/Chest: Effort normal and breath sounds normal.   Musculoskeletal: Normal range of motion.   Skin: Skin is warm and dry.   Psychiatric: He has a normal mood and affect. His behavior is normal.            ASSESSMENT/PLAN    Problem List Items Addressed This Visit        Cardiovascular and Mediastinum    Hypertension - Primary     Hypertension is unchanged.  Continue current treatment regimen.  Dietary sodium restriction.  Weight loss.  Regular aerobic exercise.  Stop smoking.  Continue current medications.  Blood pressure will be reassessed at the next regular appointment.            Respiratory    Shortness of breath     Improved with weight loss, change in smoking habits and prn use of inhaler.           Other Visit Diagnoses     Elevated liver enzymes        Check previously ordered CMP today.               Return in about 42 weeks (around 12/28/2020) for Annual with fasting labs.

## 2020-03-10 NOTE — TELEPHONE ENCOUNTER
His mother is on his NADIRA, please advise, she states his atorvastatin was previously prescribed by Sylvia Cazares NP, wants to know if he should continue with this

## 2020-03-10 NOTE — TELEPHONE ENCOUNTER
We do not have record of his previous propranolol dosing so we have listed the dosing that he has told us- 80 mg twice daily. His blood pressure is stable and he feels well so I can continue the same dose.    He was supposed to have a metabolic panel drawn before leaving yesterday but may have forgotten. I would like him to have that done so we can recheck liver enzymes. If they remain elevated, he will need to stop the atorvastatin. If they are back to normal, he can continue it.

## 2020-03-10 NOTE — TELEPHONE ENCOUNTER
Spoke with pt's mother and she states Sylvia Cazares NP had him on 40 mg 1 po qid= 160, but also said he could take 80 mg bid= 160, but mother states that pysch NP in the last week or 2 just sent in 40 mg bid=80, so she had cut it in half, but his mother states it should be a total of 160 mg and will need a prescription called in

## 2020-03-11 NOTE — TELEPHONE ENCOUNTER
Ordered the propranolol 80 mg BID.    Please see if he can stop back by for the CMP so we can decide what to do about the atorvastatin.

## 2020-03-13 NOTE — PROGRESS NOTES
Please let him know that his liver enzymes have improved. He should continue to refrain from excessive alcohol and can continue his current dose of atorvastatin- refill ordered.

## 2020-03-13 NOTE — TELEPHONE ENCOUNTER
PT MOM CALLED IN RETURNING A CALL TO MARIE PERTAINING TO HER SONS LAB RESULTS PLEASE ADVISE      PT CB NUMBER: [phone]
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
## 2020-06-19 NOTE — TELEPHONE ENCOUNTER
Who was the other provider? We refilled the dose that has been on his med list with us- not sure if it was changed during his recent hospitalizations.    Speaking of that, can we request records from [facility]?

## 2020-06-19 NOTE — TELEPHONE ENCOUNTER
/80  Pulse 103  Temp 98.1  F (36.7  C) (Oral)  Resp 19  Wt 246 lb 8 oz (111.8 kg)  SpO2 98%  BMI 34.38 kg/m2  Patient in for Post-Op.  Zoe Taylor CMA     PT PICKED UP A RX TODAY (6-) FOR PROORANOLOL 60 MG EXTENDED RELIEF FROM ANOTHER PROVIDER    DREW GUILLEN SENT IN AN RX TODAY FOR 80MG OF PROPRANOLOL, PT HAS NOT PICKED UP THAT PRESCRIPTION        [employer] NEEDS TO KNOW WHICH DOSAGE THE PT NEEDS TO BE TAKING.
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
## 2020-06-22 NOTE — TELEPHONE ENCOUNTER
Called pt and the phone is not accepting calls,  Called the other number on his phone and spoke to his mother, who said her son will call us back

## 2020-06-22 NOTE — PROGRESS NOTES
I have reviewed the notes, assessments, and/or procedures performed by Tara Bravo PA-C, I concur with her/his documentation of Vinay Cano.

## 2020-06-26 NOTE — PROGRESS NOTES
Chief Complaint   Patient presents with   • Hyperlipidemia     Pt wants to discuss propanolol and vraylar causes him to hear voices, also to discuss cialis       Subjective   Vinay Cano is a 30 y.o. male.       History of Present Illness     Pt did not restart the Vraylar because he felt like it caused him to hear voices when he took it previously. Has appointment with psychiatrist on 7/1 for telehealth visit.    Pt is not taking propranolol currently, he takes prn once or twice a day. Has 40 mg tablets. He is no longer taking the 80 mg BID, felt it was too much.    Requests more Cialis. Says he has to take 3 tablets to have and maintain an erection. Has never had prolonged erection or other SE with it.        Current Outpatient Medications:   •  atorvastatin (LIPITOR) 10 MG tablet, Take 1 tablet by mouth Daily., Disp: 30 tablet, Rfl: 5  •  levalbuterol (XOPENEX HFA) 45 MCG/ACT inhaler, Inhale 1-2 puffs Every 4 (Four) Hours As Needed for Wheezing., Disp: 15 g, Rfl: 11  •  Melatonin 10 MG tablet, Take 1 tablet by mouth Every Night., Disp: 30 tablet, Rfl: 0  •  mupirocin (BACTROBAN) 2 % ointment, Apply  topically to the appropriate area as directed 2 (Two) Times a Day As Needed (nasal ulcer)., Disp: 15 g, Rfl: 1  •  tadalafil (CIALIS) 20 MG tablet, Take 1 tablet by mouth As Needed for Erectile Dysfunction., Disp: 20 tablet, Rfl: 3  •  propranolol LA (Inderal LA) 60 MG 24 hr capsule, Take 1 capsule by mouth Daily., Disp: 30 capsule, Rfl: 3  •  VRAYLAR 1.5 MG capsule capsule, Take 1 capsule by mouth Daily. Nightly, Disp: 30 capsule, Rfl: 3     PMFSH  The following portions of the patient's history were reviewed and updated as appropriate: allergies, current medications, past family history, past medical history, past social history, past surgical history and problem list.    Review of Systems   Constitutional: Negative for activity change, appetite change and fatigue.   HENT: Negative for congestion and  "rhinorrhea.    Respiratory: Negative for chest tightness and shortness of breath.    Cardiovascular: Negative for chest pain and palpitations.   Gastrointestinal: Negative for abdominal pain.   Genitourinary: Negative for dysuria.   Musculoskeletal: Negative for arthralgias and myalgias.   Neurological: Negative for dizziness, weakness, light-headedness and headaches.   Psychiatric/Behavioral: Positive for agitation. Negative for dysphoric mood. The patient is nervous/anxious.        Objective   /78   Pulse 95   Temp 99 °F (37.2 °C)   Ht 180.3 cm (71\")   Wt 86.2 kg (190 lb)   SpO2 98%   BMI 26.50 kg/m²     Physical Exam   Constitutional: He appears well-developed and well-nourished.   HENT:   Head: Normocephalic and atraumatic.   Right Ear: External ear normal.   Left Ear: External ear normal.   Eyes: Conjunctivae are normal.   Neck: Normal range of motion.   Cardiovascular: Normal rate and regular rhythm.   Pulmonary/Chest: Effort normal and breath sounds normal.   Musculoskeletal: Normal range of motion.   Skin: Skin is warm and dry.   Psychiatric: He has a normal mood and affect. His behavior is normal.            ASSESSMENT/PLAN    Problem List Items Addressed This Visit        Cardiovascular and Mediastinum    Hypertension     Refilled propranolol XR 60 mg daily for pt to start.         Relevant Medications    propranolol LA (Inderal LA) 60 MG 24 hr capsule       Other    Schizophrenia (CMS/HCC)     Pt appears less anxious but still agitated and anxious about his living situation.         Erectile dysfunction - Primary     Agreed to increase cialis to 20 mg and gave him #20 to use prn.         Relevant Medications    tadalafil (CIALIS) 20 MG tablet               Return in about 2 months (around 8/26/2020) for Follow up.  "

## 2020-06-27 PROBLEM — N52.9 ERECTILE DYSFUNCTION: Status: ACTIVE | Noted: 2020-01-01

## 2020-07-07 NOTE — TELEPHONE ENCOUNTER
HUB TO READ    Message left for pt to notify him of negative drug screen results. Please notify pt when call is returned

## 2020-09-01 NOTE — TELEPHONE ENCOUNTER
[facility]- so far his hepatitis and HIV labs are negative. The urine test is still in process.

## 2020-09-01 NOTE — PROGRESS NOTES
Your hepatitis labs are all negative- no signs of hepatitis infection. The rest of the labs are still pending. We will let you know when the results come in.

## 2020-09-01 NOTE — TELEPHONE ENCOUNTER
DELETE AFTER REVIEWING: Telephone encounter to be sent to the clinical pool.    Caller: RachaelVinay    Relationship: Self    Best call back number: [phone]   Caller requesting test results:YES     What test was performed: LAB TESTS    When was the test performed: 08/31/20     Where was the test performed: IN OFFICE    Additional notes: REQUESTING A CALL BACK TO DISCUSS RESULTS        DELETE AFTER READING TO PATIENT: “Thank you for sharing this information with me. I will send a message to the clinical team. Please allow 48 hours for the clinical staff to follow up on this request.”

## 2020-10-08 NOTE — TELEPHONE ENCOUNTER
Pt said he went yesterday to [facility] to  his tadalfil and they have it as he is taking 10 MG.  Pt said he is supposed to be on 20 MG.  Pt is requesting a refill of tadalafil 20 MG and have it sent to [facility] on [address].

## 2020-11-13 NOTE — TELEPHONE ENCOUNTER
lvm that rx was denied due to medication was for an acute problem that should be resolved and if not and still vomiting he needs to be seen, lvm that we are open tonight until 7 and Sat 9-4
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
## 2020-11-18 NOTE — ASSESSMENT & PLAN NOTE
Psychological condition is unchanged.  Treatment per psychiatrist.  Psychological condition  will be reassessed at the next regular appointment.

## 2020-12-09 NOTE — TELEPHONE ENCOUNTER
Called pt, message not taking calls at this time, called alternate #, pt's mom, to confirm phone number, is correct.

## 2020-12-09 NOTE — TELEPHONE ENCOUNTER
Spoke w/ pt, let know that [facility] should [facility] them, he stated that he already went [facility]. Pt got upset and said that since we can't do our job and send in rf's he wants to discuss w/ Tara. Let him know that appt would probably be needed, he made appt for 12/10/20. [facility] and refills on zofran and cialis. Pharmacy stated that they do have rx and are showing refills available. I tried calling pt back, went straight to CELIO MONTILLA that [facility]fs are available.

## 2020-12-09 NOTE — TELEPHONE ENCOUNTER
Caller: Vinay Cano    Relationship: Self    Best call back number: [phone]    Medication needed:   Requested Prescriptions     Pending Prescriptions Disp Refills   • ondansetron ODT (Zofran ODT) 4 MG disintegrating tablet 30 tablet 2     Sig: Place 1 tablet on the tongue Daily.   • tadalafil (CIALIS) 20 MG tablet 20 tablet 3     Sig: Take 1 tablet by mouth As Needed for Erectile Dysfunction.       When do you need the refill by: 12/9/20    What details did the patient provide when requesting the medication:     Does the patient have less than a 3 day supply:  [x] Yes  [] No    What is the patient's preferred pharmacy: [facility] [place] [address] [facility] [phone] [facility] [phone]
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
## 2020-12-11 NOTE — TELEPHONE ENCOUNTER
PATIENT CALLED AND STATED HE NEEDED A REFERRAL FOR A UROLOGIST AND A DERMATOLOGIST.    PLEASE CONTACT PATIENT TO ADVISE.    CALLBACK:  [phone]

## 2020-12-14 NOTE — ASSESSMENT & PLAN NOTE
Smoking Cessation Counseling  DX: tobacco abuse      I advised the patient of the risks of continuing to use tobacco, and I offered smoking cessation materials as well as reviewed strategies and medications that could assist in quitting smoking.     Patient expresses willingness to work on quitting.  Patient remains in precontemplation.    willing to quit. We have discussed the following method/s for tobacco cessation:  Counseling Prescription Medicaiton.  Together we have set a quit date for 1 week from today.  He will follow up with me in 3 months or sooner to check on his progress  I advised patient to quit, and offered support.  Asked patient to inform me when they set a quit date.  I spent approximately 6 minutes counseling the patient.  Barriers: under a lot of stress now  Time spent counseling: > 3-10 minutes